# Patient Record
Sex: MALE | Race: WHITE | Employment: FULL TIME | URBAN - NONMETROPOLITAN AREA
[De-identification: names, ages, dates, MRNs, and addresses within clinical notes are randomized per-mention and may not be internally consistent; named-entity substitution may affect disease eponyms.]

---

## 2018-02-08 LAB
CHOLESTEROL, TOTAL: 191 MG/DL
CHOLESTEROL/HDL RATIO: 6.8
HDLC SERPL-MCNC: 28 MG/DL (ref 35–70)
LDL CHOLESTEROL CALCULATED: 136 MG/DL (ref 0–160)
TRIGL SERPL-MCNC: 135 MG/DL
VLDLC SERPL CALC-MCNC: 27 MG/DL

## 2018-02-21 ENCOUNTER — OFFICE VISIT (OUTPATIENT)
Dept: FAMILY MEDICINE CLINIC | Age: 38
End: 2018-02-21
Payer: COMMERCIAL

## 2018-02-21 VITALS
TEMPERATURE: 98.6 F | WEIGHT: 239.8 LBS | SYSTOLIC BLOOD PRESSURE: 118 MMHG | HEIGHT: 69 IN | HEART RATE: 60 BPM | RESPIRATION RATE: 16 BRPM | DIASTOLIC BLOOD PRESSURE: 78 MMHG | BODY MASS INDEX: 35.52 KG/M2

## 2018-02-21 DIAGNOSIS — Z00.00 WELL ADULT HEALTH CHECK: Primary | ICD-10-CM

## 2018-02-21 PROCEDURE — 99395 PREV VISIT EST AGE 18-39: CPT | Performed by: NURSE PRACTITIONER

## 2018-02-21 ASSESSMENT — PATIENT HEALTH QUESTIONNAIRE - PHQ9
SUM OF ALL RESPONSES TO PHQ QUESTIONS 1-9: 0
SUM OF ALL RESPONSES TO PHQ9 QUESTIONS 1 & 2: 0
2. FEELING DOWN, DEPRESSED OR HOPELESS: 0
1. LITTLE INTEREST OR PLEASURE IN DOING THINGS: 0

## 2018-02-21 NOTE — PROGRESS NOTES
swallowing  Cardiovascular:  Chest Pain, Palpitations  Respiratory:  Cough, Wheezing, Shortness of breath, Chest tightness, Apnea  Gastrointestinal:  Nausea, Vomiting, Diarrhea, Constipation, Heartburn, Blood in stool  Genitourinary:  Difficulty or painful urination, Flank pain, Change in frequency, Urgency  Skin:  Color change, Rash, Itching, Wound  Psychiatric:  Hallucinations, Anxiety, Depression, Suicidal ideation  Hematological:  Enlarged glands, Easy bleeding, Easily bruising  Musculoskeletal:  Joint pain, Back pain, Gait problems, Joint swelling, Myalgias  Neurological:  Dizziness, Headaches, Presyncope, Numbness, Seizures, Tremors  Allergy:  Environmental allergies, Food allergies  Endocrine:  Heat Intolerance, Cold Intolerance, Polydipsia, Polyphagia, Polyuria      PHYSICAL EXAM:  Vitals:    02/21/18 0834   BP: 118/78   Pulse: 60   Resp: 16   Temp: 98.6 °F (37 °C)     General Appearance: well developed and well- nourished, in no acute distress  Head: normocephalic and atraumatic  Eyes: extraocular eye movements intact, conjunctivae and eye lids without erythema  ENT: external ear and ear canal normal bilaterally, TMs intact and regular, nose without deformity, nasal mucosa and turbinates normal without polyps, oropharynx normal, dentition is normal for age  Neck: supple and non-tender without mass, no thyromegaly or thyroid nodules, no cervical lymphadenopathy  Pulmonary/Chest: clear to auscultation bilaterally- no wheezes, rales or rhonchi, normal air movement, no respiratory distress or retractions  Cardiovascular: normal rate, regular rhythm, normal S1 and S2, no murmurs, rubs, clicks, or gallops, distal pulses intact, no carotid bruits  Abdomen: soft, non-tender, non-distended, normal bowel sounds,  no rebound or guarding, no masses or hernias noted  Extremities: no cyanosis, clubbing or edema of the lower extremities  Musculoskeletal: No joint swelling or gross deformity   Neuro:  Alert, 5/5 strength

## 2018-03-05 ENCOUNTER — OFFICE VISIT (OUTPATIENT)
Dept: FAMILY MEDICINE CLINIC | Age: 38
End: 2018-03-05
Payer: COMMERCIAL

## 2018-03-05 VITALS
DIASTOLIC BLOOD PRESSURE: 78 MMHG | HEART RATE: 60 BPM | HEIGHT: 69 IN | BODY MASS INDEX: 35.25 KG/M2 | TEMPERATURE: 98.1 F | RESPIRATION RATE: 14 BRPM | WEIGHT: 238 LBS | SYSTOLIC BLOOD PRESSURE: 110 MMHG

## 2018-03-05 DIAGNOSIS — E78.5 DYSLIPIDEMIA (HIGH LDL; LOW HDL): ICD-10-CM

## 2018-03-05 DIAGNOSIS — R07.9 CHEST PAIN, UNSPECIFIED TYPE: Primary | ICD-10-CM

## 2018-03-05 PROCEDURE — 99213 OFFICE O/P EST LOW 20 MIN: CPT | Performed by: NURSE PRACTITIONER

## 2018-03-05 NOTE — PROGRESS NOTES
Chief Complaint   Patient presents with    Follow-up     Pt is here to f/u from [facility] ED on 3/3/18 for chest pain, and SOB. He states that he is doing well, and has not had any problems since. History obtained from chart review and the patient. SUBJECTIVE:  Jose Peralta is a 40 y.o. male that presents today for follow up ER visit for CP, dizziness. The patient states that 3/3/18 he was working out and was jogging on the treadmill. He felt weird while jogging, just tight all over, a little spaced out, he also felt SOB. He got off the treadmill and went to get his knee braces and got back on the treadmill. He was on the treadmill for a couple minutes and developed CP, SOB and felt like his heart was racing, he was also very diaphoretic. He felt like his heart was racing out of his chest. He got off the treadmill and rested on the floor for about 1/2 hour, and then went to the ER. Symptoms resolved within 45 minutes. He reports that in the ER they did EKG and labs and they were all normal, advised to f/u with PCP. Age/Gender Health Maintenance    Lipid - needs  DM Screen - needs  Colon Cancer Screening - n/a  Lung Cancer Screening (Age 54 to [de-identified] with 30 pack year hx, current smoker or quit within past 15 years) - n/a    Tetanus - needs  Influenza Vaccine - needs  Pneumonia Vaccine - needs  Zostavax - n/a   HPV Vaccine - n/a    PSA testing discussion - n/a  AAA Screening - n/a    Falls screening - n/a    No current outpatient prescriptions on file. No current facility-administered medications for this visit. No orders of the defined types were placed in this encounter. All medications reviewed and reconciled, including OTC and herbal medications. Updated list given to patient. There is no problem list on file for this patient. No past medical history on file.     Past Surgical History:   Procedure Laterality Date    APPENDECTOMY      FINGER AMPUTATION No Known Allergies    Social History     Social History    Marital status:      Spouse name: N/A    Number of children: N/A    Years of education: N/A     Occupational History    Not on file. Social History Main Topics    Smoking status: Former Smoker     Packs/day: 2.00     Years: 18.00     Types: Cigarettes    Smokeless tobacco: Never Used    Alcohol use Yes      Comment: rarely     Drug use: No    Sexual activity: Yes     Partners: Female     Other Topics Concern    Not on file     Social History Narrative    No narrative on file       Family History   Problem Relation Age of Onset    Breast Cancer Maternal Grandmother     Diabetes Maternal Grandmother     Breast Cancer Paternal Grandmother     Cancer Paternal Grandfather          I have reviewed the patient's past medical history, past surgical history, allergies, medications, social and family history and I have made updates where appropriate.       Review of Systems  Positive responses are highlighted in bold    Constitutional:  Fever, Chills, Night Sweats, Fatigue, Unexpected changes in weight  Eyes:  Eye discharge, Eye pain, Eye redness, Visual disturbances   HENT:  Ear pain, Tinnitus, Nosebleeds, Trouble swallowing, Hearing loss, Sore throat  Cardiovascular:  Chest Pain, Palpitations, Orthopnea, Paroxysmal Nocturnal Dyspnea  Respiratory:  Cough, Wheezing, Shortness of breath, Chest tightness, Apnea  Gastrointestinal:  Nausea, Vomiting, Diarrhea, Constipation, Heartburn, Blood in stool  Genitourinary:  Difficulty or painful urination, Flank pain, Change in frequency, Urgency  Skin:  Color change, Rash, Itching, Wound  Psychiatric:  Hallucinations, Anxiety, Depression, Suicidal ideation  Hematological:  Enlarged glands, Easy bleeding, Easily bruising  Musculoskeletal:  Joint pain, Back pain, Gait problems, Joint swelling, Myalgias  Neurological:  Dizziness, Headaches, Presyncope, Numbness, Seizures, Tremors  Allergy:

## 2018-03-05 NOTE — PROGRESS NOTES
Visit Information    Have you changed or started any medications since your last visit including any over-the-counter medicines, vitamins, or herbal medicines? no   Are you having any side effects from any of your medications? -  no  Have you stopped taking any of your medications? Is so, why? -  no    Have you seen any other physician or provider since your last visit? Yes - Records Obtained  Have you had any other diagnostic tests since your last visit? Yes - Records Obtained  Have you been seen in the emergency room and/or had an admission to a hospital since we last saw you? Yes - Records Obtained  Have you had your routine dental cleaning in the past 6 months? no    Have you activated your [facility] account? If not, what are your barriers?  Yes     Patient Care Team:  Aspen Rich CNP as PCP - General (Family Medicine)    Medical History Review  Past Medical, Family, and Social History reviewed and does contribute to the patient presenting condition    Health Maintenance   Topic Date Due    HIV screen  08/12/1996    Flu vaccine (1) 09/01/2017    DTaP/Tdap/Td vaccine (2 - Td) 11/15/2026

## 2018-10-26 ENCOUNTER — OFFICE VISIT (OUTPATIENT)
Dept: FAMILY MEDICINE CLINIC | Age: 38
End: 2018-10-26
Payer: COMMERCIAL

## 2018-10-26 VITALS
DIASTOLIC BLOOD PRESSURE: 62 MMHG | SYSTOLIC BLOOD PRESSURE: 134 MMHG | TEMPERATURE: 97.9 F | HEIGHT: 68 IN | BODY MASS INDEX: 41.25 KG/M2 | HEART RATE: 76 BPM | RESPIRATION RATE: 14 BRPM | WEIGHT: 272.2 LBS

## 2018-10-26 DIAGNOSIS — M54.42 ACUTE LEFT-SIDED LOW BACK PAIN WITH LEFT-SIDED SCIATICA: Primary | ICD-10-CM

## 2018-10-26 PROCEDURE — 99213 OFFICE O/P EST LOW 20 MIN: CPT | Performed by: NURSE PRACTITIONER

## 2018-10-26 PROCEDURE — 96372 THER/PROPH/DIAG INJ SC/IM: CPT | Performed by: NURSE PRACTITIONER

## 2018-10-26 RX ORDER — KETOROLAC TROMETHAMINE 30 MG/ML
30 INJECTION, SOLUTION INTRAMUSCULAR; INTRAVENOUS ONCE
Status: COMPLETED | OUTPATIENT
Start: 2018-10-26 | End: 2018-10-26

## 2018-10-26 RX ORDER — CYCLOBENZAPRINE HCL 10 MG
10 TABLET ORAL
COMMUNITY
Start: 2018-10-24 | End: 2019-06-14

## 2018-10-26 RX ORDER — NAPROXEN 500 MG/1
500 TABLET ORAL
COMMUNITY
Start: 2018-10-24 | End: 2019-06-14

## 2018-10-26 RX ORDER — PREDNISONE 20 MG/1
40 TABLET ORAL DAILY
Qty: 10 TABLET | Refills: 0 | Status: SHIPPED | OUTPATIENT
Start: 2018-10-26 | End: 2018-10-31

## 2018-10-26 RX ORDER — METHYLPREDNISOLONE ACETATE 40 MG/ML
80 INJECTION, SUSPENSION INTRA-ARTICULAR; INTRALESIONAL; INTRAMUSCULAR; SOFT TISSUE ONCE
Status: COMPLETED | OUTPATIENT
Start: 2018-10-26 | End: 2018-10-26

## 2018-10-26 RX ORDER — HYDROCODONE BITARTRATE AND ACETAMINOPHEN 5; 325 MG/1; MG/1
1 TABLET ORAL EVERY 6 HOURS PRN
Qty: 12 TABLET | Refills: 0 | Status: SHIPPED | OUTPATIENT
Start: 2018-10-26 | End: 2018-10-29

## 2018-10-26 RX ADMIN — METHYLPREDNISOLONE ACETATE 80 MG: 40 INJECTION, SUSPENSION INTRA-ARTICULAR; INTRALESIONAL; INTRAMUSCULAR; SOFT TISSUE at 12:51

## 2018-10-26 RX ADMIN — KETOROLAC TROMETHAMINE 30 MG: 30 INJECTION, SOLUTION INTRAMUSCULAR; INTRAVENOUS at 12:53

## 2018-10-26 NOTE — PATIENT INSTRUCTIONS
click on the \"Sign Up Now\" link. Current as of: November 29, 2017  Content Version: 11.7  © 2032-6595 [employer]. Care instructions adapted under license by [employer] [facility] ([facility]). If you have questions about a medical condition or this instruction, always ask your healthcare professional. [address] any warranty or liability for your use of this information. Low Back Pain: Exercises  Your Care Instructions  Here are some examples of typical rehabilitation exercises for your condition. Start each exercise slowly. Ease off the exercise if you start to have pain. Your doctor or physical therapist will tell you when you can start these exercises and which ones will work best for you. How to do the exercises  Press-up    7. Lie on your stomach, supporting your body with your forearms. 8. Press your elbows down into the floor to raise your upper back. As you do this, relax your stomach muscles and allow your back to arch without using your back muscles. As your press up, do not let your hips or pelvis come off the floor. 9. Hold for 15 to 30 seconds, then relax. 10. Repeat 2 to 4 times. Alternate arm and leg (bird dog) exercise    Do this exercise slowly. Try to keep your body straight at all times, and do not let one hip drop lower than the other. 1. Start on the floor, on your hands and knees. 2. Tighten your belly muscles. 3. Raise one leg off the floor, and hold it straight out behind you. Be careful not to let your hip drop down, because that will twist your trunk. 4. Hold for about 6 seconds, then lower your leg and switch to the other leg. 5. Repeat 8 to 12 times on each leg. 6. Over time, work up to holding for 10 to 30 seconds each time. 7. If you feel stable and secure with your leg raised, try raising the opposite arm straight out in front of you at the same time. Knee-to-chest exercise    1.  Lie on your back with your knees bent and your feet flat on the

## 2019-01-15 ENCOUNTER — OFFICE VISIT (OUTPATIENT)
Dept: FAMILY MEDICINE CLINIC | Age: 39
End: 2019-01-15
Payer: COMMERCIAL

## 2019-01-15 VITALS
BODY MASS INDEX: 38.65 KG/M2 | WEIGHT: 270 LBS | HEART RATE: 60 BPM | DIASTOLIC BLOOD PRESSURE: 78 MMHG | SYSTOLIC BLOOD PRESSURE: 130 MMHG | RESPIRATION RATE: 18 BRPM | HEIGHT: 70 IN | TEMPERATURE: 98 F

## 2019-01-15 DIAGNOSIS — J06.9 ACUTE UPPER RESPIRATORY INFECTION: Primary | ICD-10-CM

## 2019-01-15 PROCEDURE — 99213 OFFICE O/P EST LOW 20 MIN: CPT | Performed by: NURSE PRACTITIONER

## 2019-01-15 RX ORDER — PROMETHAZINE HYDROCHLORIDE AND CODEINE PHOSPHATE 6.25; 1 MG/5ML; MG/5ML
5 SYRUP ORAL 4 TIMES DAILY PRN
Qty: 120 ML | Refills: 0 | Status: SHIPPED | OUTPATIENT
Start: 2019-01-15 | End: 2019-01-22

## 2019-01-15 RX ORDER — AMOXICILLIN 875 MG/1
875 TABLET, COATED ORAL 2 TIMES DAILY
Qty: 20 TABLET | Refills: 0 | Status: SHIPPED | OUTPATIENT
Start: 2019-01-15 | End: 2019-01-25

## 2019-06-14 ENCOUNTER — OFFICE VISIT (OUTPATIENT)
Dept: BARIATRICS/WEIGHT MGMT | Age: 39
End: 2019-06-14
Payer: COMMERCIAL

## 2019-06-14 VITALS
HEIGHT: 69 IN | RESPIRATION RATE: 18 BRPM | HEART RATE: 72 BPM | WEIGHT: 263.2 LBS | DIASTOLIC BLOOD PRESSURE: 78 MMHG | BODY MASS INDEX: 38.98 KG/M2 | SYSTOLIC BLOOD PRESSURE: 110 MMHG | TEMPERATURE: 97.9 F

## 2019-06-14 DIAGNOSIS — N20.0 NEPHROLITHIASIS: ICD-10-CM

## 2019-06-14 DIAGNOSIS — Z91.89 AT RISK FOR OBSTRUCTIVE SLEEP APNEA: ICD-10-CM

## 2019-06-14 DIAGNOSIS — E66.09 CLASS 2 OBESITY DUE TO EXCESS CALORIES WITH BODY MASS INDEX (BMI) OF 38.0 TO 38.9 IN ADULT, UNSPECIFIED WHETHER SERIOUS COMORBIDITY PRESENT: Primary | ICD-10-CM

## 2019-06-14 PROCEDURE — 99203 OFFICE O/P NEW LOW 30 MIN: CPT | Performed by: SURGERY

## 2019-06-14 RX ORDER — CETIRIZINE HYDROCHLORIDE 10 MG/1
10 TABLET ORAL PRN
COMMUNITY

## 2019-06-14 NOTE — PROGRESS NOTES
Subjective:     Patient ID: Norma Sinclair is a 41 y.o. male    Chief Complaint   Patient presents with    Bariatric, Initial Visit     New patient ; 6 month requirement      HPI  Sorin Morris is a 81-year-old male who presents for initial evaluation secondary to his obesity. BMI 38. He states he has been overweight most of his adult life. He has tried multiple diets and has occasionally been successful at weight loss but unfortunately regains the weight as time goes on. Previously tried the ketogenic diet. Lost about 100 pounds but gained half of it back so far. Currently trying weight watchers as well. He states he has some shortness of breath with increased activity and fatigue due to excess body weight. Lower extremity joint aching and lower back discomfort due to excess body weight. He admits he needs to improve with nutrition and portion sizes. Denies current chest or abdominal pain. No hematochezia or melena. No urinary complaints. BANG score 4. He admits that if he is not able to lose significant weight with medical management only he would like to possibly proceed with a sleeve gastrectomy. Review of Systems   Constitutional: Positive for fatigue. Negative for activity change, appetite change, chills, diaphoresis, fever and unexpected weight change. HENT: Negative for congestion, dental problem, drooling, ear discharge, ear pain, facial swelling, hearing loss, mouth sores, nosebleeds, postnasal drip, rhinorrhea, sinus pressure, sneezing, sore throat, tinnitus, trouble swallowing and voice change. Eyes: Negative for photophobia, pain, discharge, redness, itching and visual disturbance. Respiratory: Negative for apnea, cough, choking, chest tightness, shortness of breath, wheezing and stridor. Cardiovascular: Negative for chest pain, palpitations and leg swelling.    Gastrointestinal: Negative for abdominal distention, abdominal pain, anal bleeding, blood in stool, constipation, diarrhea, nausea, rectal pain and vomiting. Endocrine: Negative. Genitourinary: Negative for decreased urine volume, difficulty urinating, discharge, dysuria, enuresis, flank pain, frequency, genital sores, hematuria, penile pain, penile swelling, scrotal swelling, testicular pain and urgency. Musculoskeletal: Positive for back pain and myalgias. Negative for arthralgias, gait problem, joint swelling, neck pain and neck stiffness. Skin: Negative for color change, pallor, rash and wound. Allergic/Immunologic: Negative. Neurological: Negative for dizziness, tremors, seizures, syncope, facial asymmetry, speech difficulty, weakness, light-headedness, numbness and headaches. Hematological: Negative for adenopathy. Does not bruise/bleed easily. Psychiatric/Behavioral: Negative for agitation, behavioral problems, confusion, decreased concentration, dysphoric mood, hallucinations, self-injury, sleep disturbance and suicidal ideas. The patient is not nervous/anxious and is not hyperactive. Past Medical History:   Diagnosis Date    Kidney stone        Past Surgical History:   Procedure Laterality Date    APPENDECTOMY      FINGER AMPUTATION         Current Outpatient Medications   Medication Sig Dispense Refill    cetirizine (ZYRTEC) 10 MG tablet Take 10 mg by mouth daily       No current facility-administered medications for this visit.         No Known Allergies    Family History   Problem Relation Age of Onset    Heart Disease Mother     Breast Cancer Maternal Grandmother     Diabetes Maternal Grandmother     Breast Cancer Paternal Grandmother     Cancer Paternal Grandfather        Social History     Socioeconomic History    Marital status:      Spouse name: Not on file    Number of children: Not on file    Years of education: Not on file    Highest education level: Not on file   Occupational History    Not on file   Social Needs    Financial resource strain: Not on file   Eneida-Diego insecurity:     Worry: Not on file     Inability: Not on file    Transportation needs:     Medical: Not on file     Non-medical: Not on file   Tobacco Use    Smoking status: Former Smoker     Packs/day: 2.00     Years: 18.00     Pack years: 36.00     Types: Cigarettes     Last attempt to quit: 2016     Years since quitting: 3.4    Smokeless tobacco: Never Used   Substance and Sexual Activity    Alcohol use: Yes     Comment: rarely     Drug use: No    Sexual activity: Yes     Partners: Female   Lifestyle    Physical activity:     Days per week: Not on file     Minutes per session: Not on file    Stress: Not on file   Relationships    Social connections:     Talks on phone: Not on file     Gets together: Not on file     Attends [employer] service: Not on file     Active member of club or organization: Not on file     Attends meetings of clubs or organizations: Not on file     Relationship status: Not on file    Intimate partner violence:     Fear of current or ex partner: Not on file     Emotionally abused: Not on file     Physically abused: Not on file     Forced sexual activity: Not on file   Other Topics Concern    Not on file   Social History Narrative    Not on file     /78 (Site: Left Upper Arm, Position: Sitting, Cuff Size: Large Adult)   Pulse 72   Temp 97.9 °F (36.6 °C) (Oral)   Resp 18   Ht 5' 9\" (1.753 m)   Wt 263 lb 3.2 oz (119.4 kg)   BMI 38.87 kg/m²     Body mass index is 38.87 kg/m². Neck 16\"  Waist 50\"     Objective:   Physical Exam   Constitutional: He is oriented to person, place, and time. He appears well-developed and well-nourished. No distress. HENT:   Head: Normocephalic and atraumatic. Right Ear: External ear normal.   Left Ear: External ear normal.   Nose: Nose normal.   Mouth/Throat: Oropharynx is clear and moist.   Eyes: Conjunctivae and EOM are normal. Right eye exhibits no discharge. Left eye exhibits no discharge. No scleral icterus.    Neck: Normal range of motion. Neck supple. Cardiovascular: Normal rate, regular rhythm, normal heart sounds and intact distal pulses. Pulmonary/Chest: Effort normal and breath sounds normal. No respiratory distress. He has no wheezes. He has no rales. He exhibits no tenderness. Abdominal: Soft. Bowel sounds are normal. He exhibits no distension and no mass. There is no tenderness. There is no rebound and no guarding. Musculoskeletal: Normal range of motion. He exhibits no edema or tenderness. Neurological: He is alert and oriented to person, place, and time. No cranial nerve deficit. Skin: Skin is warm and dry. No rash noted. He is not diaphoretic. No erythema. No pallor. Psychiatric: He has a normal mood and affect. His behavior is normal. Judgment and thought content normal.   Vitals reviewed.       CBC   Lab Results   Component Value Date    WBC 12.0 11/26/2012    RBC 4.95 11/26/2012    HGB 14.8 11/26/2012    HCT 43.6 11/26/2012    MCV 88.2 11/26/2012    MCH 29.9 11/26/2012    MCHC 33.9 11/26/2012    RDW 12.5 11/26/2012     11/26/2012    MPV 8.4 11/26/2012    RBCMORP NORMAL 11/26/2012    SEGSPCT 69.8 11/26/2012    LABLYMP 20.0 11/26/2012    MONOPCT 8.1 11/26/2012    LABEOS 1.8 11/26/2012    BASO 0.3 11/26/2012    NRBC 0 11/26/2012        BMP/CMP   Lab Results   Component Value Date    GLUCOSE 91 11/26/2012    CREATININE 0.9 11/26/2012    BUN 12 11/26/2012     11/26/2012    K 4.1 11/26/2012     11/26/2012    CO2 28 11/26/2012    CALCIUM 9.2 11/26/2012    AST 21 11/26/2012    ALKPHOS 61 11/26/2012    PROT 6.7 11/26/2012    LABALBU 4.5 11/26/2012    BILITOT 0.5 11/26/2012    ALT 31 11/26/2012        PREALBUMIN   No results found for: PREALBUMIN     VITAMIN B12  No results found for: [id number]     24 HOUR URINE CALCIUM   No results found for: URVOLMEAS, HOURSC, CALCIUMUR     VITAMIN D   No results found for: VITD25     VITAMIN B1/THIAMINE   No results found for: [id number]     RBC FOLATE   No results found for: FOLATE     LIPID SCREEN (FASTING)   Lab Results   Component Value Date    CHOL 191 02/08/2018    TRIG 135 02/08/2018    HDL 28 02/08/2018    LDLCALC 136 02/08/2018   ,     HGA1C (T2DM ONLY)   No results found for: LABA1C, AVGG     TSH  No results found for: TSH     IRON   No results found for: IRON     IONIZED CALCIUM   No results found for: IONCA, CAION     Imaging - none    Patient Active Problem List   Diagnosis    Dyslipidemia (high LDL; low HDL)     Assessment:   1. Obesity (BMI 38)  2. Nephrolithiasis    Plan:   1. Long discussion about the pros and cons of weight loss surgery. The risks benefits and alternatives to laparoscopic adjustable band, gastric sleeve and gastric Theo-en-Y bypass were discussed in detail. The pros and cons of robotic assisted, laparoscopic and open techniques were discussed. 2.  Behavior modification discussed in detail in regards to dietary habits. 3.  Nutritional education occurred during visit. Will set up a consultation/evaluation with dietitian for further evaluation. 4.  Options for medical management of morbid obesity discussed. 5.  Improvement in fitness/exercise discussed with patient and the need for this with/without surgery. 6.  Obtain medical necessity letter from PCP as needed. 7.  Follow-up in one month at weight management program at [address] [place] [address]. 8.  Signs and symptoms reviewed with patient that would be concerning and need him to return to office for re-evaluation. Patient states he will call if he has questions or concerns. 9. Multivitamin  10. Psychology evaluation  11. EGD prior to any surgical intervention  12. Encouraged support groups  13. Encouraged Naturally Slim/Rev It Up  14. Discussed the pros and cons along with possible side effects/complications of weight loss medications. All questions answered.   15.  Sleep study    Patient states that if he is not able to lose enough adequate excess body weight with medical management only then he would be like to proceed with surgical intervention such as sleeve gastrectomy/gastric bypass for further weight loss. More than 30 minutes spent with patient today. Greater than 50% of the time was involved counseling, educaton and coordinating care.

## 2019-06-15 ASSESSMENT — ENCOUNTER SYMPTOMS
SORE THROAT: 0
BACK PAIN: 1
EYE DISCHARGE: 0
WHEEZING: 0
COUGH: 0
EYE ITCHING: 0
PHOTOPHOBIA: 0
COLOR CHANGE: 0
CHOKING: 0
APNEA: 0
EYE REDNESS: 0
SINUS PRESSURE: 0
EYE PAIN: 0
ABDOMINAL PAIN: 0
DIARRHEA: 0
ALLERGIC/IMMUNOLOGIC NEGATIVE: 1
ABDOMINAL DISTENTION: 0
CHEST TIGHTNESS: 0
STRIDOR: 0
BLOOD IN STOOL: 0
TROUBLE SWALLOWING: 0
VOMITING: 0
RHINORRHEA: 0
NAUSEA: 0
VOICE CHANGE: 0
RECTAL PAIN: 0
SHORTNESS OF BREATH: 0
CONSTIPATION: 0
FACIAL SWELLING: 0
ANAL BLEEDING: 0

## 2020-04-03 ENCOUNTER — NURSE TRIAGE (OUTPATIENT)
Dept: OTHER | Facility: CLINIC | Age: 40
End: 2020-04-03

## 2020-04-03 NOTE — TELEPHONE ENCOUNTER
Patient called [facility] pre-service center [facility]) to schedule appointment, with red flag complaint, transferred to RN access for triage. Reports having dry cough and body aches, which began Wednesday. Denies any fever. Patient informed of disposition. Provided patient with telehealth options and walk-in clinics. Care advice as documented. Instructed patient to call back with worsening symptoms. Patient verbalized understanding and denies any further questions/concerns. Please do not respond to the triage nurse through this encounter. Any subsequent communication should be directly with the patient.     Reason for Disposition   Using nasal washes and pain medicine > 24 hours and sinus pain persists    Protocols used: COUGH-ADULT-OH

## 2020-04-03 NOTE — TELEPHONE ENCOUNTER
He needs to call the call center [phone] and be triaged and likely present to one of the Flu Clinics.

## 2020-07-09 ENCOUNTER — TELEMEDICINE (OUTPATIENT)
Dept: FAMILY MEDICINE CLINIC | Age: 40
End: 2020-07-09
Payer: COMMERCIAL

## 2020-07-09 PROCEDURE — 99213 OFFICE O/P EST LOW 20 MIN: CPT | Performed by: NURSE PRACTITIONER

## 2020-07-09 RX ORDER — MELOXICAM 15 MG/1
15 TABLET ORAL DAILY
Qty: 30 TABLET | Refills: 3 | Status: SHIPPED | OUTPATIENT
Start: 2020-07-09 | End: 2021-04-21

## 2020-07-09 ASSESSMENT — ENCOUNTER SYMPTOMS
SORE THROAT: 0
NAUSEA: 0
ABDOMINAL PAIN: 0
TROUBLE SWALLOWING: 0
SHORTNESS OF BREATH: 0
EYE PAIN: 0
EYE REDNESS: 0
DIARRHEA: 0
VOMITING: 0
WHEEZING: 0
COLOR CHANGE: 0
COUGH: 0
RHINORRHEA: 0

## 2020-07-09 NOTE — PROGRESS NOTES
(MOBIC) 15 MG tablet Take 1 tablet by mouth daily Yes Nicole Moy, APRN - CNP   cetirizine (ZYRTEC) 10 MG tablet Take 10 mg by mouth daily  Historical Provider, MD       Social History     Tobacco Use    Smoking status: Former Smoker     Packs/day: 2.00     Years: 18.00     Pack years: 36.00     Types: Cigarettes     Last attempt to quit: 2016     Years since quittin.5    Smokeless tobacco: Never Used   Substance Use Topics    Alcohol use: Yes     Comment: rarely     Drug use: No        No Known Allergies,   Past Medical History:   Diagnosis Date    Kidney stone    ,   Past Surgical History:   Procedure Laterality Date    APPENDECTOMY      FINGER AMPUTATION     ,   Family History   Problem Relation Age of Onset    Heart Disease Mother     Breast Cancer Maternal Grandmother     Diabetes Maternal Grandmother     Breast Cancer Paternal Grandmother     Cancer Paternal Grandfather    ,   Immunization History   Administered Date(s) Administered    Influenza Vaccine, unspecified formulation 2016    Pneumococcal Polysaccharide ([id number]) 11/15/2016    Tdap (Boostrix, Adacel) 11/15/2016   ,   Health Maintenance   Topic Date Due    Varicella vaccine (1 of 2 - 2-dose childhood series) 1982    HIV screen  1996    Flu vaccine (1) 2020    DTaP/Tdap/Td vaccine (2 - Td) 11/15/2026    Hepatitis A vaccine  Aged Out    Hepatitis B vaccine  Aged Out    Hib vaccine  Aged Out    Meningococcal (ACWY) vaccine  Aged Out    Pneumococcal 0-64 years Vaccine  Aged Out       PHYSICAL EXAMINATION:  [ INSTRUCTIONS:  \"[x]\" Indicates a positive item  \"[]\" Indicates a negative item  -- DELETE ALL ITEMS NOT EXAMINED]  Vital Signs: (As obtained by patient/caregiver or practitioner observation)    Blood pressure-  Heart rate-    Respiratory rate-    Temperature-  Pulse oximetry-     Constitutional: [x] Appears well-developed and well-nourished [x] No apparent distress      [] Abnormal-   Mental status  [x] Alert and awake  [x] Oriented to person/place/time [x]Able to follow commands      Eyes:  EOM    [x]  Normal  [] Abnormal-  Sclera  [x]  Normal  [] Abnormal -         Discharge [x]  None visible  [] Abnormal -    HENT:   [x] Normocephalic, atraumatic. [] Abnormal   [x] Mouth/Throat: Mucous membranes are moist.     External Ears [x] Normal  [] Abnormal-     Neck: [x] No visualized mass     Pulmonary/Chest: [x] Respiratory effort normal.  [x] No visualized signs of difficulty breathing or respiratory distress        [] Abnormal-      Musculoskeletal:   [x] Normal gait with no signs of ataxia         [x] Normal range of motion of neck        [] Abnormal-       Neurological:        [x] No Facial Asymmetry (Cranial nerve 7 motor function) (limited exam to video visit)          [x] No gaze palsy        [] Abnormal-         Skin:        [x] No significant exanthematous lesions or discoloration noted on facial skin         [] Abnormal-            Psychiatric:       [x] Normal Affect [x] No Hallucinations        [] Abnormal-     Other pertinent observable physical exam findings-     ASSESSMENT/PLAN:  1. Left hip pain  - Suspect hip bursitis  - discussed HEP  - obtain xray and start Mobic  - call if no improvement and would refer to ortho  - XR HIP 2-3 VW W PELVIS LEFT; Future  - meloxicam (MOBIC) 15 MG tablet; Take 1 tablet by mouth daily  Dispense: 30 tablet; Refill: 3      Return if symptoms worsen or fail to improve. Sheri Renee is a 46 y.o. male being evaluated by a Virtual Visit (video visit) encounter to address concerns as mentioned above. A caregiver was present when appropriate. Due to this being a TeleHealth encounter (During [place] [address] public health emergency), evaluation of the following organ systems was limited: Vitals/Constitutional/EENT/Resp/CV/GI//MS/Neuro/Skin/Heme-Lymph-Imm.   Pursuant to the emergency declaration under the [address] [facility] [place] [address] waiver authority and the [employer] Act, this Virtual Visit was conducted with patient's (and/or legal guardian's) consent, to reduce the patient's risk of exposure to COVID-19 and provide necessary medical care. The patient (and/or legal guardian) has also been advised to contact this office for worsening conditions or problems, and seek emergency medical treatment and/or call 911 if deemed necessary. Services were provided through a video synchronous discussion virtually to substitute for in-person clinic visit. Patient and provider were located at their individual homes. --JOEL Ferrari CNP on 7/9/2020 at 10:01 AM    An electronic signature was used to authenticate this note.

## 2020-07-13 ENCOUNTER — PATIENT MESSAGE (OUTPATIENT)
Dept: FAMILY MEDICINE CLINIC | Age: 40
End: 2020-07-13

## 2020-07-13 ENCOUNTER — TELEPHONE (OUTPATIENT)
Dept: FAMILY MEDICINE CLINIC | Age: 40
End: 2020-07-13

## 2020-07-13 ENCOUNTER — HOSPITAL ENCOUNTER (OUTPATIENT)
Dept: GENERAL RADIOLOGY | Age: 40
Discharge: HOME | End: 2020-07-13
Payer: COMMERCIAL

## 2020-07-13 ENCOUNTER — HOSPITAL ENCOUNTER (OUTPATIENT)
Age: 40
Discharge: HOME | End: 2020-07-13
Payer: COMMERCIAL

## 2020-07-13 PROCEDURE — 73502 X-RAY EXAM HIP UNI 2-3 VIEWS: CPT

## 2020-07-13 RX ORDER — TRAMADOL HYDROCHLORIDE 50 MG/1
50 TABLET ORAL EVERY 8 HOURS PRN
Qty: 15 TABLET | Refills: 0 | Status: SHIPPED | OUTPATIENT
Start: 2020-07-13 | End: 2020-07-20 | Stop reason: SDUPTHER

## 2020-07-13 NOTE — TELEPHONE ENCOUNTER
From: Brenda Renee  To: JOEL Dash CNP  Sent: 7/13/2020 4:19 PM EDT  Subject: Visit Follow-Up Question    If you wouldn't mind placing a referral, I guess [facility] would work? I don't know any others unless there is one you prefer to work with, I'm not picky at all!      ----- Message -----   From:JOEL Mix CNP   Sent:7/13/2020 3:01 PM EDT   To:Ciaran Giordano   Subject:RE: Visit Follow-Up Question    Tan Dalton,    Yes I got your xray results back, the office should be calling you about it, but they are normal. I did send a Rx for something for pain to your pharmacy. It should help. Would you like to go ahead and see ortho? If so, let me know who you prefer to see and i'll place the referral.    Ansley Haskins      ----- Message -----   From:Ciaran Giordano   Sent:7/13/2020 2:00 PM EDT   To:JOEL Mix CNP   Subject:Visit Follow-Up Question    I got my X-Ray done this morning, still in a lot of pain, it actually seems to be getting worse. I've been taking a lot of ibuprofen and aleve with little to no relief. I was wondering if it's possible for me to get something for the pain so I can at least sleep comfortably?

## 2020-07-13 NOTE — TELEPHONE ENCOUNTER
From: Tattnall Samples  To: JOEL Carrillo - CNP  Sent: 7/13/2020 2:00 PM EDT  Subject: Visit Follow-Up Question    I got my X-Ray done this morning, still in a lot of pain, it actually seems to be getting worse. I've been taking a lot of ibuprofen and aleve with little to no relief. I was wondering if it's possible for me to get something for the pain so I can at least sleep comfortably?

## 2020-07-13 NOTE — TELEPHONE ENCOUNTER
Pt informed. Pt states he sent my chart msg to [facility], and he has gotten a response from him reference the pain in his hip. See my chart msg from today 7/13/20.

## 2020-07-14 ENCOUNTER — TELEPHONE (OUTPATIENT)
Dept: FAMILY MEDICINE CLINIC | Age: 40
End: 2020-07-14

## 2020-07-14 NOTE — TELEPHONE ENCOUNTER
Regarding referral to [facility]--  Referral to [facility] is canceled as patient has a debt to pay and is in collections. Patient will advise us when this is taken care of and we can place a new referral. Thanks.

## 2020-07-20 ENCOUNTER — PATIENT MESSAGE (OUTPATIENT)
Dept: FAMILY MEDICINE CLINIC | Age: 40
End: 2020-07-20

## 2020-07-20 RX ORDER — TRAMADOL HYDROCHLORIDE 50 MG/1
50 TABLET ORAL EVERY 8 HOURS PRN
Qty: 15 TABLET | Refills: 0 | Status: SHIPPED | OUTPATIENT
Start: 2020-07-20 | End: 2020-07-27 | Stop reason: SDUPTHER

## 2020-07-20 NOTE — TELEPHONE ENCOUNTER
From: Jojo Marroquin  To: JOEL Shen - CNP  Sent: 7/20/2020 4:05 PM EDT  Subject: Prescription Question    The wife got my [facility] appointment moved up to the 27th. I have one day worth of pain meds left and was hoping I could get a refill to get me through as the pain hasn't subsided.

## 2020-07-27 RX ORDER — TRAMADOL HYDROCHLORIDE 50 MG/1
50 TABLET ORAL EVERY 8 HOURS PRN
Qty: 15 TABLET | Refills: 0 | Status: SHIPPED | OUTPATIENT
Start: 2020-07-27 | End: 2020-07-29 | Stop reason: SDUPTHER

## 2020-07-27 NOTE — TELEPHONE ENCOUNTER
Shashank Elder called requesting a refill on the following medications:  Requested Prescriptions     Pending Prescriptions Disp Refills    traMADol (ULTRAM) 50 MG tablet 15 tablet 0     Sig: Take 1 tablet by mouth every 8 hours as needed for Pain for up to 5 days. Pharmacy verified:[facility]  . pv      Date of last visit: 07/09/20  Date of next visit (if applicable): Visit date not found

## 2020-07-28 ENCOUNTER — PATIENT MESSAGE (OUTPATIENT)
Dept: FAMILY MEDICINE CLINIC | Age: 40
End: 2020-07-28

## 2020-07-28 NOTE — TELEPHONE ENCOUNTER
From: Juan Manuel Pro  To: Tameka Banuelos APRN - CNP  Sent: 7/28/2020 10:32 AM EDT  Subject: Visit Follow-Up Question    Michael Kevin to [facility] yesterday. The hip doctor says it's my back causing the pain and scheduled an MRI for 8/5 and an appointment with a back specialist on 8/13 as well as PT between now and then. Wanted to update that the pain is still awful and now the toes on my left foot are numb most of the time. I don't know if I need to get refill of pain meds from you or [facility] but I can't function off aleve and ibuprofen it's just not cutting the pain. Sorry for being a pain in the butt, I just don't know who I should be contacting or how much I should be updating you!

## 2020-07-29 ENCOUNTER — PATIENT MESSAGE (OUTPATIENT)
Dept: FAMILY MEDICINE CLINIC | Age: 40
End: 2020-07-29

## 2020-07-29 RX ORDER — TRAMADOL HYDROCHLORIDE 50 MG/1
50 TABLET ORAL EVERY 8 HOURS PRN
Qty: 42 TABLET | Refills: 0 | Status: SHIPPED | OUTPATIENT
Start: 2020-07-29 | End: 2020-08-12

## 2020-07-29 NOTE — TELEPHONE ENCOUNTER
From: Bacilio Paulson  To: JOEL Farrell CNP  Sent: 7/29/2020 10:59 AM EDT  Subject: Visit Follow-Up Question    I'm unsure if the last message sent but yes, the tramadol keeps me functional!      ----- Message -----   JOEL Hebert CNP   Sent:7/29/2020 10:22 AM EDT   To:Ciaran Alanis   Subject:RE: Visit Follow-Up Question    OK, does the tramadol make things manageable? i'd prefer to do the lowest strength possible pain medication that will help keep things manageable and help you be able to function.      ----- Message -----   From:Ciaran Alanis   Sent:7/29/2020 10:10 AM EDT   To:JOEL Carson CNP   Subject:RE: Visit Follow-Up Question    I called [facility] today and Dr Sejal Hawley will not order pre-op pain meds. I don't want to be a whiner but unfortunately I really can't get through my day to day on ibuprofen and aleve. I did get my appointment with the surgeon moved up from the 13th to the 6th though!      ----- Message -----   From:JOEL Carson CNP   Sent:7/28/2020 11:23 AM EDT   To:Ciaran Alanis   Subject:RE: Visit Follow-Up Question    I would call [facility] and discuss this with them first. If they won't do anything about pain meds, let me know.      ----- Message -----   From:Ciaran Alanis   Sent:9/33/5 11:14 AM EDT   To:JOEL Carson CNP   Subject:RE: Visit Follow-Up Question    I did. He was pretty much in and out of the room in a couple of minutes and I didn't see him again so I assumed I should be contacting you? If I need to contact [facility] I will.       ----- Message -----   From:ELENA DAMON   Sent:7/28/2020 10:58 AM EDT   To:Ciaran Alanis   Subject:RE: Visit Follow-Up Question    Did you mention your concerns to the provider out at [facility]?      ----- Message -----   From:Ciaran Alanis   Sent:7/28/2020 10:32 AM EDT   To:JOEL Carson - CNP   Subject:Visit Follow-Up Question    Suman Cortez to [facility] yesterday.  The hip doctor says it's my back causing the pain and scheduled an MRI for 8/5 and an appointment with a back specialist on 8/13 as well as PT between now and then. Wanted to update that the pain is still awful and now the toes on my left foot are numb most of the time. I don't know if I need to get refill of pain meds from you or [facility] but I can't function off aleve and ibuprofen it's just not cutting the pain. Sorry for being a pain in the butt, I just don't know who I should be contacting or how much I should be updating you!

## 2020-11-03 ENCOUNTER — PATIENT MESSAGE (OUTPATIENT)
Dept: FAMILY MEDICINE CLINIC | Age: 40
End: 2020-11-03

## 2020-11-03 ENCOUNTER — VIRTUAL VISIT (OUTPATIENT)
Dept: FAMILY MEDICINE CLINIC | Age: 40
End: 2020-11-03
Payer: COMMERCIAL

## 2020-11-03 PROCEDURE — 99213 OFFICE O/P EST LOW 20 MIN: CPT | Performed by: NURSE PRACTITIONER

## 2020-11-03 RX ORDER — BENZONATATE 100 MG/1
100-200 CAPSULE ORAL 3 TIMES DAILY PRN
Qty: 42 CAPSULE | Refills: 0 | Status: SHIPPED | OUTPATIENT
Start: 2020-11-03 | End: 2020-11-10

## 2020-11-03 ASSESSMENT — PATIENT HEALTH QUESTIONNAIRE - PHQ9
SUM OF ALL RESPONSES TO PHQ QUESTIONS 1-9: 0
1. LITTLE INTEREST OR PLEASURE IN DOING THINGS: 0
SUM OF ALL RESPONSES TO PHQ9 QUESTIONS 1 & 2: 0
2. FEELING DOWN, DEPRESSED OR HOPELESS: 0
SUM OF ALL RESPONSES TO PHQ QUESTIONS 1-9: 0
SUM OF ALL RESPONSES TO PHQ QUESTIONS 1-9: 0

## 2020-11-03 ASSESSMENT — ENCOUNTER SYMPTOMS
EYE REDNESS: 0
COUGH: 1
WHEEZING: 0
RHINORRHEA: 0
SHORTNESS OF BREATH: 1
TROUBLE SWALLOWING: 0
DIARRHEA: 1
VOMITING: 0
SORE THROAT: 1
ABDOMINAL PAIN: 0
COLOR CHANGE: 0
NAUSEA: 0
EYE PAIN: 0

## 2020-11-03 NOTE — PROGRESS NOTES
benzonatate (TESSALON) 100 MG capsule Take 1-2 capsules by mouth 3 times daily as needed for Cough Yes JOEL Crowe CNP   meloxicam (MOBIC) 15 MG tablet Take 1 tablet by mouth daily  JOEL Crowe CNP   cetirizine (ZYRTEC) 10 MG tablet Take 10 mg by mouth daily  Historical Provider, MD       Social History     Tobacco Use    Smoking status: Former Smoker     Packs/day: 2.00     Years: 18.00     Pack years: 36.00     Types: Cigarettes     Last attempt to quit: 2016     Years since quittin.8    Smokeless tobacco: Never Used   Substance Use Topics    Alcohol use: Yes     Comment: rarely     Drug use: No        No Known Allergies,   Past Medical History:   Diagnosis Date    Kidney stone    ,   Past Surgical History:   Procedure Laterality Date    APPENDECTOMY      FINGER AMPUTATION     ,   Family History   Problem Relation Age of Onset    Heart Disease Mother     Breast Cancer Maternal Grandmother     Diabetes Maternal Grandmother     Breast Cancer Paternal Grandmother     Cancer Paternal Grandfather    ,   Immunization History   Administered Date(s) Administered    Influenza Vaccine, unspecified formulation 2016    Pneumococcal Polysaccharide ([id number]) 11/15/2016    Tdap (Boostrix, Adacel) 11/15/2016   ,   Health Maintenance   Topic Date Due    Varicella vaccine (1 of 2 - 2-dose childhood series) 1982    HIV screen  1996    DTaP/Tdap/Td vaccine (2 - Td) 11/15/2026    Flu vaccine  Completed    Hepatitis A vaccine  Aged Out    Hepatitis B vaccine  Aged Out    Hib vaccine  Aged Out    Meningococcal (ACWY) vaccine  Aged Out    Pneumococcal 0-64 years Vaccine  Aged Out       PHYSICAL EXAMINATION:  [ INSTRUCTIONS:  \"[x]\" Indicates a positive item  \"[]\" Indicates a negative item  -- DELETE ALL ITEMS NOT EXAMINED]  Vital Signs: (As obtained by patient/caregiver or practitioner observation)    Blood pressure-  Heart rate-    Respiratory rate-    Temperature- Pulse oximetry-     Constitutional: [x] Appears well-developed and well-nourished [x] No apparent distress      [] Abnormal-   Mental status  [x] Alert and awake  [x] Oriented to person/place/time [x]Able to follow commands      Eyes:  EOM    [x]  Normal  [] Abnormal-  Sclera  [x]  Normal  [] Abnormal -         Discharge [x]  None visible  [] Abnormal -    HENT:   [x] Normocephalic, atraumatic. [] Abnormal   [x] Mouth/Throat: Mucous membranes are moist.     External Ears [x] Normal  [] Abnormal-     Neck: [x] No visualized mass     Pulmonary/Chest: [x] Respiratory effort normal.  [x] No visualized signs of difficulty breathing or respiratory distress        [] Abnormal-      Musculoskeletal:   [x] Normal gait with no signs of ataxia         [x] Normal range of motion of neck        [] Abnormal-       Neurological:        [x] No Facial Asymmetry (Cranial nerve 7 motor function) (limited exam to video visit)          [x] No gaze palsy        [] Abnormal-         Skin:        [x] No significant exanthematous lesions or discoloration noted on facial skin         [] Abnormal-            Psychiatric:       [x] Normal Affect [x] No Hallucinations        [] Abnormal-     Other pertinent observable physical exam findings-     ASSESSMENT/PLAN:  1. Suspected COVID-19 virus infection  - COVID-19 Ambulatory; Future    2. Cough  - COVID-19 Ambulatory; Future  - benzonatate (TESSALON) 100 MG capsule; Take 1-2 capsules by mouth 3 times daily as needed for Cough  Dispense: 42 capsule; Refill: 0    3. Anosmia  - COVID-19 Ambulatory; Future    - likely COVID  - proceed with testing to confirm  - self quarantine    Return if symptoms worsen or fail to improve. Vazquez Moulton is a 45 y.o. male being evaluated by a Virtual Visit (video visit) encounter to address concerns as mentioned above. A caregiver was present when appropriate.  Due to this being a TeleHealth encounter ([facility]), evaluation of the following organ systems was limited: Vitals/Constitutional/EENT/Resp/CV/GI//MS/Neuro/Skin/Heme-Lymph-Imm. Pursuant to the emergency declaration under the [address] [place] [address], [address] [place] [address] and the [employer] Act, this Virtual Visit was conducted with patient's (and/or legal guardian's) consent, to reduce the patient's risk of exposure to COVID-19 and provide necessary medical care. The patient (and/or legal guardian) has also been advised to contact this office for worsening conditions or problems, and seek emergency medical treatment and/or call 911 if deemed necessary. Services were provided through a video synchronous discussion virtually to substitute for in-person clinic visit. Patient and provider were located at their individual homes. --JOEL Marshall CNP on 11/3/2020 at 11:32 AM    An electronic signature was used to authenticate this note.

## 2020-11-03 NOTE — TELEPHONE ENCOUNTER
From: Carlton Ramos  To: JOEL Rao CNP  Sent: 11/3/2020 6:32 AM EST  Subject: Non-Urgent Medical Question    I was wondering if I could get a Covid test ordered so I can have a test at our local drive through site. I've had body aches and pains since Sunday, low fever and a dry cough sinus burning and as of last night diarrhea and no smell.

## 2020-11-05 ENCOUNTER — TELEPHONE (OUTPATIENT)
Dept: FAMILY MEDICINE CLINIC | Age: 40
End: 2020-11-05

## 2020-11-05 RX ORDER — PROMETHAZINE HYDROCHLORIDE AND CODEINE PHOSPHATE 6.25; 1 MG/5ML; MG/5ML
5 SYRUP ORAL 4 TIMES DAILY PRN
Qty: 120 ML | Refills: 0 | Status: SHIPPED | OUTPATIENT
Start: 2020-11-05 | End: 2020-11-11

## 2020-11-05 NOTE — TELEPHONE ENCOUNTER
i'll send in a cough syrup which can really help him with the cough and to rest.    Controlled Substance Monitoring:    Acute and Chronic Pain Monitoring:   RX Monitoring 11/5/2020   Attestation -   Periodic Controlled Substance Monitoring No signs of potential drug abuse or diversion identified.

## 2020-11-05 NOTE — TELEPHONE ENCOUNTER
Pt says he is doing the same, no better. Still having severe body aches. Cant get comfortable laying or sitting or standing and causing him to not get any sleep.        Pharmacy- [facility]

## 2020-11-05 NOTE — TELEPHONE ENCOUNTER
----- Message from JOEL Tracy CNP sent at 11/5/2020  8:51 AM EST -----  Let Harvinder Layne know his COVID test was positive. Is he feeling any better?

## 2021-04-21 ENCOUNTER — OFFICE VISIT (OUTPATIENT)
Dept: FAMILY MEDICINE CLINIC | Age: 41
End: 2021-04-21
Payer: COMMERCIAL

## 2021-04-21 ENCOUNTER — TELEPHONE (OUTPATIENT)
Dept: FAMILY MEDICINE CLINIC | Age: 41
End: 2021-04-21

## 2021-04-21 VITALS
OXYGEN SATURATION: 97 % | WEIGHT: 286.4 LBS | HEIGHT: 70 IN | TEMPERATURE: 98.3 F | HEART RATE: 71 BPM | BODY MASS INDEX: 41 KG/M2 | RESPIRATION RATE: 10 BRPM | DIASTOLIC BLOOD PRESSURE: 86 MMHG | SYSTOLIC BLOOD PRESSURE: 128 MMHG

## 2021-04-21 DIAGNOSIS — L50.9 URTICARIA: ICD-10-CM

## 2021-04-21 DIAGNOSIS — Z91.09 MULTIPLE ENVIRONMENTAL ALLERGIES: Primary | ICD-10-CM

## 2021-04-21 DIAGNOSIS — J45.20 MILD INTERMITTENT ASTHMA WITH ALLERGIC RHINITIS, UNSPECIFIED WHETHER COMPLICATED: ICD-10-CM

## 2021-04-21 PROBLEM — R00.2 PALPITATIONS: Status: ACTIVE | Noted: 2018-03-03

## 2021-04-21 PROBLEM — R07.89 ATYPICAL CHEST PAIN: Status: ACTIVE | Noted: 2018-03-03

## 2021-04-21 PROCEDURE — 99213 OFFICE O/P EST LOW 20 MIN: CPT | Performed by: NURSE PRACTITIONER

## 2021-04-21 RX ORDER — LORATADINE 10 MG/1
10 TABLET ORAL DAILY
COMMUNITY

## 2021-04-21 RX ORDER — ACETAMINOPHEN 500 MG
1000 TABLET ORAL EVERY 6 HOURS PRN
COMMUNITY

## 2021-04-21 RX ORDER — ALBUTEROL SULFATE 90 UG/1
2 AEROSOL, METERED RESPIRATORY (INHALATION) 4 TIMES DAILY PRN
Qty: 1 INHALER | Refills: 1 | Status: SHIPPED | OUTPATIENT
Start: 2021-04-21

## 2021-04-21 RX ORDER — IBUPROFEN 800 MG/1
800 TABLET ORAL EVERY 6 HOURS PRN
COMMUNITY

## 2021-04-21 RX ORDER — MONTELUKAST SODIUM 10 MG/1
10 TABLET ORAL NIGHTLY
Qty: 30 TABLET | Refills: 5 | Status: SHIPPED | OUTPATIENT
Start: 2021-04-21

## 2021-04-21 SDOH — ECONOMIC STABILITY: TRANSPORTATION INSECURITY
IN THE PAST 12 MONTHS, HAS THE LACK OF TRANSPORTATION KEPT YOU FROM MEDICAL APPOINTMENTS OR FROM GETTING MEDICATIONS?: NO

## 2021-04-21 SDOH — ECONOMIC STABILITY: TRANSPORTATION INSECURITY
IN THE PAST 12 MONTHS, HAS LACK OF TRANSPORTATION KEPT YOU FROM MEETINGS, WORK, OR FROM GETTING THINGS NEEDED FOR DAILY LIVING?: NO

## 2021-04-21 SDOH — ECONOMIC STABILITY: INCOME INSECURITY: HOW HARD IS IT FOR YOU TO PAY FOR THE VERY BASICS LIKE FOOD, HOUSING, MEDICAL CARE, AND HEATING?: NOT HARD AT ALL

## 2021-04-21 SDOH — ECONOMIC STABILITY: FOOD INSECURITY: WITHIN THE PAST 12 MONTHS, YOU WORRIED THAT YOUR FOOD WOULD RUN OUT BEFORE YOU GOT MONEY TO BUY MORE.: NEVER TRUE

## 2021-04-21 ASSESSMENT — PATIENT HEALTH QUESTIONNAIRE - PHQ9
1. LITTLE INTEREST OR PLEASURE IN DOING THINGS: 0
2. FEELING DOWN, DEPRESSED OR HOPELESS: 0
SUM OF ALL RESPONSES TO PHQ QUESTIONS 1-9: 0
SUM OF ALL RESPONSES TO PHQ9 QUESTIONS 1 & 2: 0
SUM OF ALL RESPONSES TO PHQ QUESTIONS 1-9: 0

## 2021-04-21 NOTE — PROGRESS NOTES
Chief Complaint   Patient presents with    Allergies     would like to discuss allergy shots       History obtained from chart review and the patient. SUBJECTIVE:  Nasrin Oleary is a 45 y.o. male that presents today for allergies    Seasonal Allergies  C/o allergy symptoms all year round. Triggers are cats, dogs, pollen, dust, etc. Symptoms are some SOB, wheezing, feels like his mouth is swelling inside of ears swelling, rhinorrhea, itchy/watery eyes. Has worse issues during May when [facility]. He also breaks out into hives whenever his dogs touch him. He gets asthma attacks rarely, maybe one every couple months. He takes Zyrtec daily.     Age/Gender Health Maintenance    Lipid -   Lab Results   Component Value Date    CHOL 191 02/08/2018     Lab Results   Component Value Date    TRIG 135 02/08/2018     Lab Results   Component Value Date    HDL 28 (A) 02/08/2018     Lab Results   Component Value Date    LDLCALC 136 02/08/2018     Lab Results   Component Value Date    VLDL 27 02/08/2018     Lab Results   Component Value Date    CHOLHDLRATIO 6.8 02/08/2018     DM Screen - No results found for: LABA1C  No results found for: EAG    Colon Cancer Screening - 49  Lung Cancer Screening (Age 54 to [de-identified] with 30 pack year hx, current smoker or quit within past 15 years) - n/a    Tetanus - needs  Influenza Vaccine - yearly  Pneumonia Vaccine - 65  Zostavax - 50   HPV Vaccine - n/a    PSA testing discussion - 55  AAA Screening - n/a    Falls screening - n/a    Current Outpatient Medications   Medication Sig Dispense Refill    acetaminophen (TYLENOL) 500 MG tablet Take 1,000 mg by mouth every 6 hours as needed      ibuprofen (ADVIL;MOTRIN) 800 MG tablet Take 800 mg by mouth every 6 hours as needed      loratadine (CLARITIN) 10 MG tablet Take 10 mg by mouth daily Patient reports that he switches between zyrtec and caritin      albuterol sulfate HFA (VENTOLIN HFA) 108 (90 Base) MCG/ACT inhaler Inhale 2 puffs into the lungs 4 times daily as needed for Wheezing 1 Inhaler 1    montelukast (SINGULAIR) 10 MG tablet Take 1 tablet by mouth nightly 30 tablet 5    cetirizine (ZYRTEC) 10 MG tablet Take 10 mg by mouth as needed Patient reports that he switches between zyrtec and caritin       No current facility-administered medications for this visit. Orders Placed This Encounter   Medications    albuterol sulfate HFA (VENTOLIN HFA) 108 (90 Base) MCG/ACT inhaler     Sig: Inhale 2 puffs into the lungs 4 times daily as needed for Wheezing     Dispense:  1 Inhaler     Refill:  1    montelukast (SINGULAIR) 10 MG tablet     Sig: Take 1 tablet by mouth nightly     Dispense:  30 tablet     Refill:  5         All medications reviewed and reconciled, including OTC and herbal medications. Updated list given to patient.        Patient Active Problem List   Diagnosis    Dyslipidemia (high LDL; low HDL)    Atypical chest pain    Palpitations       Past Medical History:   Diagnosis Date    Kidney stone        Past Surgical History:   Procedure Laterality Date    APPENDECTOMY      FINGER AMPUTATION         No Known Allergies    Social History     Socioeconomic History    Marital status:      Spouse name: Not on file    Number of children: Not on file    Years of education: Not on file    Highest education level: Not on file   Occupational History    Not on file   Social Needs    Financial resource strain: Not hard at all   [place] insecurity     Worry: Never true     Inability: Never true   [employer] needs     Medical: No     Non-medical: No   Tobacco Use    Smoking status: Former Smoker     Packs/day: 2.00     Years: 18.00     Pack years: 36.00     Types: Cigarettes     Quit date:      Years since quittin.3    Smokeless tobacco: Never Used   Substance and Sexual Activity    Alcohol use: Yes     Comment: rarely     Drug use: No    Sexual activity: Yes     Partners: Female   Lifestyle    Physical activity     Days per week: Not on file     Minutes per session: Not on file    Stress: Not on file   Relationships    Social connections     Talks on phone: Not on file     Gets together: Not on file     Attends Spiritism service: Not on file     Active member of club or organization: Not on file     Attends meetings of clubs or organizations: Not on file     Relationship status: Not on file    Intimate partner violence     Fear of current or ex partner: Not on file     Emotionally abused: Not on file     Physically abused: Not on file     Forced sexual activity: Not on file   Other Topics Concern    Not on file   Social History Narrative    Not on file       Family History   Problem Relation Age of Onset    Heart Disease Mother     Breast Cancer Maternal Grandmother     Diabetes Maternal Grandmother     Breast Cancer Paternal Grandmother     Cancer Paternal Grandfather          I have reviewed the patient's past medical history, past surgical history, allergies, medications, social and family history and I have made updates where appropriate.       Review of Systems  Positive responses are highlighted in bold    Constitutional:  Fever, Chills, Night Sweats, Fatigue, Unexpected changes in weight  Eyes:  Eye discharge, Eye pain, Eye redness, Visual disturbances   HENT:  Ear pain, Tinnitus, Nosebleeds, Trouble swallowing, Hearing loss, Sore throat  Cardiovascular:  Chest Pain, Palpitations, Orthopnea, Paroxysmal Nocturnal Dyspnea  Respiratory:  Cough, Wheezing, Shortness of breath, Chest tightness, Apnea  Gastrointestinal:  Nausea, Vomiting, Diarrhea, Constipation, Heartburn, Blood in stool  Genitourinary:  Difficulty or painful urination, Flank pain, Change in frequency, Urgency  Skin:  Color change, Rash, Itching, Wound  Psychiatric:  Hallucinations, Anxiety, Depression, Suicidal ideation  Hematological:  Enlarged glands, Easy bleeding, Easily bruising  Musculoskeletal:  Joint pain, Back pain, Gait problems, Joint swelling, Myalgias  Neurological:  Dizziness, Headaches, Presyncope, Numbness, Seizures, Tremors  Allergy:  Environmental allergies, Food allergies  Endocrine:  Heat Intolerance, Cold Intolerance, Polydipsia, Polyphagia, Polyuria    PHYSICAL EXAM:  Vitals:    04/21/21 0921   BP: 128/86   Pulse: 71   Resp: 10   Temp: 98.3 °F (36.8 °C)   TempSrc: Oral   SpO2: 97%   Weight: 286 lb 6.4 oz (129.9 kg)   Height: 5' 9.88\" (1.775 m)     Body mass index is 41.23 kg/m². VS Reviewed  General Appearance: A&O x 3, No acute distress,well developed and well- nourished  Head: normocephalic and atraumatic  Eyes: pupils equal, round, and reactive to light, extraocular eye movements intact, conjunctivae and eye lids without erythema  Neck: supple and non-tender without mass, no thyromegaly or thyroid nodules, no cervical lymphadenopathy  Pulmonary/Chest: clear to auscultation bilaterally- no wheezes, rales or rhonchi, normal air movement, no respiratory distress or retractions  Cardiovascular: S1 and S2 auscultated w/ RRR. No murmurs, rubs, clicks, or gallops, distal pulses intact. Abdomen: soft, non-tender, non-distended, bowl sounds physiologic,  no rebound or guarding, no masses or hernias noted. Liver and spleen without enlargement. Extremities: no cyanosis, clubbing or edema of the lower extremities  Musculoskeletal: No joint swelling or gross deformity   Neuro:  Alert, 5/5 strength globally and symmetrically  Psych: Affect appropriate. Mood normal. Thought process is normal without evidence of depression or psychosis. Good insight and appropriate interaction. Cognition and memory appear to be intact. Skin: warm and dry, no rash or erythema  Lymph:  No cervical, auricular or supraclavicular lymph nodes palpated    ASSESSMENT & PLAN  Ramonita Landaverde was seen today for allergies. Diagnoses and all orders for this visit:    Multiple environmental allergies  -     montelukast (SINGULAIR) 10 MG tablet;  Take 1 tablet by mouth nightly  -     External Referral To Allergy    Mild intermittent asthma with allergic rhinitis, unspecified whether complicated  -     Full PFT Study With Bronchodilator; Future  -     albuterol sulfate HFA (VENTOLIN HFA) 108 (90 Base) MCG/ACT inhaler; Inhale 2 puffs into the lungs 4 times daily as needed for Wheezing  -     montelukast (SINGULAIR) 10 MG tablet; Take 1 tablet by mouth nightly  -     External Referral To Allergy    Urticaria  -     External Referral To Allergy      - con't Zyrtec  - add Singulair  - obtain PFT and refill of albuterol which he can use prn  - refer to Allergy for further management    DISPOSITION    Return if symptoms worsen or fail to improve. Michell Ortega released without restrictions. PATIENT COUNSELING    Counseling was provided today regarding the following topics: Healthy eating habits, Regular exercise, substance abuse and healthy sleep habits. Michell Ortega received counseling on the following healthy behaviors: tobacco cessation    Patient given educational materials on: See Attached    I have instructed Michell Ortega to complete a self tracking handout on none and instructed them to bring it with them to his next appointment. Barriers to learning and self management: none    Discussed use, benefit, and side effects of prescribed medications. Barriers to medication compliance addressed. All patient questions answered. Pt voiced understanding.        Electronically signed by JOEL Ni CNP on 4/21/2021 at 9:33 AM

## 2021-04-21 NOTE — TELEPHONE ENCOUNTER
scheduling PFT 04/30/21 @ [facility] @ 1:00pm   Arrive @ 12:45 , due to construction follow the yellow signs to enter [facility] .    Bring ID and Insurance cards   ** no breathing meds 4 hrs prior  **

## 2021-05-10 ENCOUNTER — TELEPHONE (OUTPATIENT)
Dept: FAMILY MEDICINE CLINIC | Age: 41
End: 2021-05-10

## 2021-05-10 NOTE — TELEPHONE ENCOUNTER
----- Message from JOEL Hansen CNP sent at 5/10/2021  8:55 AM EDT -----  Let Niurka uJarez know his lung function test was normal which is not too surprising since he has more mild asthma. rec'd he f/u with the allergist as scheduled.

## 2023-07-25 NOTE — PATIENT INSTRUCTIONS
exposed skin. · See a dentist one or two times a year for checkups and to have your teeth cleaned. · Wear a seat belt in the car. · Drink alcohol in moderation, if at all. That means no more than 2 drinks a day for men and 1 drink a day for women. Follow your doctor's advice about when to have certain tests. These tests can spot problems early. For everyone  · Cholesterol. Have the fat (cholesterol) in your blood tested after age 21. Your doctor will tell you how often to have this done based on your age, family history, or other things that can increase your risk for heart disease. · Blood pressure. Have your blood pressure checked during a routine doctor visit. Your doctor will tell you how often to check your blood pressure based on your age, your blood pressure results, and other factors. · Vision. Talk with your doctor about how often to have a glaucoma test.  · Diabetes. Ask your doctor whether you should have tests for diabetes. · Colon cancer. Have a test for colon cancer at age 48. You may have one of several tests. If you are younger than 48, you may need a test earlier if you have any risk factors. Risk factors include whether you already had a precancerous polyp removed from your colon or whether your parent, brother, sister, or child has had colon cancer. For women  · Breast exam and mammogram. Talk to your doctor about when you should have a clinical breast exam and a mammogram. Medical experts differ on whether and how often women under 50 should have these tests. Your doctor can help you decide what is right for you. · Pap test and pelvic exam. Begin Pap tests at age 24. A Pap test is the best way to find cervical cancer. The test often is part of a pelvic exam. Ask how often to have this test.  · Tests for sexually transmitted infections (STIs). Ask whether you should have tests for STIs.  You may be at risk if you have sex with more than one person, especially if your partners do not wear
24

## 2024-02-09 ENCOUNTER — OFFICE VISIT (OUTPATIENT)
Dept: BARIATRICS/WEIGHT MGMT | Age: 44
End: 2024-02-09
Payer: COMMERCIAL

## 2024-02-09 VITALS
HEART RATE: 94 BPM | TEMPERATURE: 97.9 F | WEIGHT: 279.6 LBS | DIASTOLIC BLOOD PRESSURE: 102 MMHG | SYSTOLIC BLOOD PRESSURE: 144 MMHG | HEIGHT: 68 IN | BODY MASS INDEX: 42.37 KG/M2

## 2024-02-09 DIAGNOSIS — N20.0 NEPHROLITHIASIS: ICD-10-CM

## 2024-02-09 DIAGNOSIS — E66.01 MORBID OBESITY WITH BMI OF 40.0-44.9, ADULT (HCC): Primary | ICD-10-CM

## 2024-02-09 DIAGNOSIS — G47.33 OBSTRUCTIVE SLEEP APNEA: ICD-10-CM

## 2024-02-09 DIAGNOSIS — G89.29 CHRONIC LOW BACK PAIN, UNSPECIFIED BACK PAIN LATERALITY, UNSPECIFIED WHETHER SCIATICA PRESENT: ICD-10-CM

## 2024-02-09 DIAGNOSIS — M54.50 CHRONIC LOW BACK PAIN, UNSPECIFIED BACK PAIN LATERALITY, UNSPECIFIED WHETHER SCIATICA PRESENT: ICD-10-CM

## 2024-02-09 PROCEDURE — 99204 OFFICE O/P NEW MOD 45 MIN: CPT | Performed by: SURGERY

## 2024-02-09 RX ORDER — SEMAGLUTIDE 2.4 MG/.75ML
2.4 INJECTION, SOLUTION SUBCUTANEOUS WEEKLY
COMMUNITY
Start: 2023-12-08

## 2024-02-09 RX ORDER — TESTOSTERONE CYPIONATE 200 MG/ML
INJECTION, SOLUTION INTRAMUSCULAR
COMMUNITY
Start: 2024-01-31

## 2024-02-10 ASSESSMENT — ENCOUNTER SYMPTOMS
ABDOMINAL PAIN: 0
CONSTIPATION: 0
BLOOD IN STOOL: 0
CHOKING: 0
VOICE CHANGE: 0
BACK PAIN: 0
EYE PAIN: 0
VOMITING: 0
SHORTNESS OF BREATH: 0
ABDOMINAL DISTENTION: 0
APNEA: 0
EYE DISCHARGE: 0
NAUSEA: 0
ANAL BLEEDING: 0
WHEEZING: 0
SORE THROAT: 0
EYE ITCHING: 0
FACIAL SWELLING: 0
EYE REDNESS: 0
CHEST TIGHTNESS: 0
TROUBLE SWALLOWING: 0
RECTAL PAIN: 0
STRIDOR: 0
SINUS PRESSURE: 0
PHOTOPHOBIA: 0
COUGH: 0
ALLERGIC/IMMUNOLOGIC NEGATIVE: 1
RHINORRHEA: 0
COLOR CHANGE: 0
DIARRHEA: 0

## 2024-02-10 NOTE — PROGRESS NOTES
Ciaran Owen (:  1980)     ASSESSMENT:  1.  Morbid obesity (BMI 42)  2.  Chronic lower back pain  3.  Sleep apnea  4.  Nephrolithiasis    PLAN:  1. Long discussion about the pros and cons of weight loss surgery.  The risks benefits and alternatives to laparoscopic adjustable band, gastric sleeve and gastric Theo-en-Y bypass were discussed in detail.  The pros and cons of robotic assisted, laparoscopic and open techniques were discussed.  2.  Behavior modification discussed in detail in regards to dietary habits.  3.  Nutritional education occurred during visit.  Will set up a consultation/evaluation with dietitian for further evaluation.   4.  Options for medical management of morbid obesity discussed.  5.  Improvement in fitness/exercise discussed with patient and the need for this with/without surgery.  6.  Obtain medical necessity letter from PCP as needed.  7.  Follow-up in one month at weight management program at [facility].  8.  Signs and symptoms reviewed with patient that would be concerning and need him to return to office for re-evaluation. Patient states he will call if he has questions or concerns.   9. Multivitamin  10.  Psychology evaluation  11.  EGD prior to any surgical intervention  12.  Encouraged support groups  13.  Encouraged Naturally Slim/Rev It Up  14.  Discussed the pros and cons along with possible side effects/complications of weight loss medications.  All questions answered.    Patient states that if he is not able to lose enough adequate excess body weight with medical management only then he would be like to proceed with surgical intervention such as sleeve gastrectomy/gastric bypass for further weight loss.    Total time spent today: 53 minutes     SUBJECTIVE/OBJECTIVE:    Chief Complaint   Patient presents with    New Patient     New pt - desires surgery     GAIL Wagoner is a 43-year-old male who presents for initial evaluation at the weight management

## 2024-02-12 NOTE — PROGRESS NOTES
kcal x 1.2 (activity factor)= 2574 kcal - 500-1000 calories/day  (for 1-2 lb weight loss/week)= 8846-4834 calories per day  Food recall reveals often eating twice a day with sometimes heavier snack in evening and at times contributes to GERD  Suspect decrease GI motility with GLP-1.  Shows interest in working on nutrition behaviors in preparation for bariatric surgery    PES Statement:  Overweight/Obesity related to reduced exercise, sedentary lifestyle, varied eating habits and eating patterns, and hx of unsuccessful diet attempts as evidenced by  Body mass index is 42.42 kg/m²..    Surgery  Surgical procedure desired: gastric sleeve   Patient's greatest concern about having surgery is: recovery post op.  Patient describes the motivation for weight loss surgery to be: Be active with your 8 year old and grand kids in future.    The expectations following the surgery were reviewed in detail with patient today and patient made aware will require to eliminate carbonated beverages, aim for regular meal times, monitor portion sizes, and balanced meals..   he does feel he can deal with the dietary restrictions.     Patient states he does understand the consequences of not complying with post-op food guidelines.  Patient states he understands the long term changes in food intake that will be necessary for all occasions after surgery for the rest of her life.     Patient is deemed nutritionally appropriate to proceed if able to show progress towards nutrition behavior changes discussed today.    Plan  Patient will begin working on recommendations from Pre-Weight Loss Surgery Behavior Change Goal Sheet that was reviewed today to assist with weight loss and establish a  long term healthy eating pattern.  Individual goals set with patient to be reviewed at monthly office visits.    Weight loss goal of 3-5% from initial weight at first visit with Dr Oviedo reviewed with patient to achieve over 6 month period per insurance

## 2024-02-13 ENCOUNTER — OFFICE VISIT (OUTPATIENT)
Dept: BARIATRICS/WEIGHT MGMT | Age: 44
End: 2024-02-13

## 2024-02-13 VITALS — HEIGHT: 68 IN | WEIGHT: 279 LBS | BODY MASS INDEX: 42.28 KG/M2

## 2024-02-13 DIAGNOSIS — Z13.21 MALNUTRITION SCREEN: ICD-10-CM

## 2024-02-13 DIAGNOSIS — E78.5 DYSLIPIDEMIA (HIGH LDL; LOW HDL): ICD-10-CM

## 2024-02-13 DIAGNOSIS — G47.33 OBSTRUCTIVE SLEEP APNEA: Primary | ICD-10-CM

## 2024-02-13 DIAGNOSIS — E66.01 MORBID OBESITY WITH BMI OF 40.0-44.9, ADULT (HCC): Primary | ICD-10-CM

## 2024-02-13 DIAGNOSIS — Z01.818 PRE-OP TESTING: ICD-10-CM

## 2024-02-13 NOTE — PATIENT INSTRUCTIONS
Goals:  1. I will get the lab work done that is mailed to my mailing address before next office visit.  You will need to fast 10-12 hours for this (no food or drink besides water).  2  I will aim for at least 64 oz of water each day (= 8 cups per day or four bottled haines)  3.  I will use my food journal to record meal times, serving sizes and bring back to next dietitian visit.  4   I will increase my physical activity by continuing treadmill and/or walking outside at least four days a week  5.  Initial weight loss goal of 3-5% is recommended over 6 month period.  This is a minimum of: 8-14 lbs from your weight when initially met with physician of: 279 lbs.

## 2024-02-27 DIAGNOSIS — E66.01 MORBID OBESITY WITH BMI OF 40.0-44.9, ADULT (HCC): ICD-10-CM

## 2024-02-27 DIAGNOSIS — Z13.21 MALNUTRITION SCREEN: ICD-10-CM

## 2024-02-27 DIAGNOSIS — Z01.818 PRE-OP TESTING: ICD-10-CM

## 2024-02-28 DIAGNOSIS — Z01.818 PRE-OP TESTING: ICD-10-CM

## 2024-02-28 DIAGNOSIS — E66.01 MORBID OBESITY WITH BMI OF 40.0-44.9, ADULT (HCC): ICD-10-CM

## 2024-02-28 DIAGNOSIS — E55.9 VITAMIN D DEFICIENCY: Primary | ICD-10-CM

## 2024-02-28 DIAGNOSIS — Z13.21 MALNUTRITION SCREEN: ICD-10-CM

## 2024-02-28 RX ORDER — ERGOCALCIFEROL 1.25 MG/1
50000 CAPSULE ORAL WEEKLY
Qty: 12 CAPSULE | Refills: 0 | Status: SHIPPED | OUTPATIENT
Start: 2024-02-28

## 2024-03-12 NOTE — PROGRESS NOTES
Assessment: Patient is a 43 y.o. male seen for   month one  follow up MNT visit for  pre op bariatric surgery desires sleeve    Vitals from current and previous visits:   3/13/2024  9:24 AM   Vitals    SYSTOLIC    DIASTOLIC    Site    Position    Cuff Size    Pulse    Temp    Respirations    SpO2    Weight - Scale 271 lb    Height 5' 8\"    Body Mass Index 41.21 kg/m2 (H)    Waist (Inches)    Neck circumference (Inches)         Initial weight at start of Weight Management Program was: 279 lbs     Ciaran lost 8 lbs over one month  -Weight goal: lose weight.     -Nutritionally relevant labs:  PTH-106, B1-197, Vit A-54, Ferritin-11, Vit D less than 19  Lab Results   Component Value Date/Time    GLUCOSE 91 11/26/2012 12:52 PM    CHOL 191 02/08/2018 12:00 AM    HDL 28 (A) 02/08/2018 12:00 AM    LDLCALC 136 02/08/2018 12:00 AM    TRIG 135 02/08/2018 12:00 AM     Has Wegovy 2.4mg from June 2023 thru current as now able to get at affordable cost         RUMA appointment is 4/8/24    - Is patient taking daily Multivitamin:  Changed to Equate MVI for adults once a day and will increase to two a day for 36 mg iron/day with low ferritin,  Started [place] 600 mg Calcium in evenings for PTH level.  Weekly Vitamin D3 50,000IUs for low level    Pt, spouse and 8 year old and 19 year old  Works full time ~ 8a-5pm M-F.  Goes to bed ~ 9p-10p and wakes up 5am    -Food Recall:   Using My Fitness Pal to log foods averaging ~ 7177-3425 calories  Breakfast: 7:30am- now trying to eat in morning- banana, or apple and string cheese.  Or Protein Shake- Premier [employer]  Lunch: 11:30a-12p- goes home for lunch - Healthy Choice Meal or can of soup  After work- 5pm- string cheese, apple or banana.   Dinner: 7-8pm - chicken enchilada last evening  Snack: trying not to snack much in evening.  Sometimes has fruit  Main Beverages: water with Great Value brand energy squeezer in morning (90mg caffeine in 3 ml's)  flavor enhancer at times.  Total of

## 2024-03-13 ENCOUNTER — OFFICE VISIT (OUTPATIENT)
Dept: BARIATRICS/WEIGHT MGMT | Age: 44
End: 2024-03-13
Payer: COMMERCIAL

## 2024-03-13 ENCOUNTER — OFFICE VISIT (OUTPATIENT)
Dept: BARIATRICS/WEIGHT MGMT | Age: 44
End: 2024-03-13

## 2024-03-13 VITALS
WEIGHT: 271 LBS | HEART RATE: 72 BPM | SYSTOLIC BLOOD PRESSURE: 132 MMHG | DIASTOLIC BLOOD PRESSURE: 80 MMHG | TEMPERATURE: 98.2 F | HEIGHT: 68 IN | BODY MASS INDEX: 41.07 KG/M2

## 2024-03-13 VITALS — HEIGHT: 68 IN | BODY MASS INDEX: 41.07 KG/M2 | WEIGHT: 271 LBS

## 2024-03-13 DIAGNOSIS — R79.89 LOW TESTOSTERONE: ICD-10-CM

## 2024-03-13 DIAGNOSIS — G89.29 CHRONIC LOW BACK PAIN, UNSPECIFIED BACK PAIN LATERALITY, UNSPECIFIED WHETHER SCIATICA PRESENT: ICD-10-CM

## 2024-03-13 DIAGNOSIS — N20.0 NEPHROLITHIASIS: ICD-10-CM

## 2024-03-13 DIAGNOSIS — M54.50 CHRONIC LOW BACK PAIN, UNSPECIFIED BACK PAIN LATERALITY, UNSPECIFIED WHETHER SCIATICA PRESENT: ICD-10-CM

## 2024-03-13 DIAGNOSIS — E55.9 VITAMIN D DEFICIENCY: ICD-10-CM

## 2024-03-13 DIAGNOSIS — G47.33 OSA ON CPAP: ICD-10-CM

## 2024-03-13 DIAGNOSIS — R79.0 LOW FERRITIN: ICD-10-CM

## 2024-03-13 PROCEDURE — 99214 OFFICE O/P EST MOD 30 MIN: CPT | Performed by: PHYSICIAN ASSISTANT

## 2024-03-13 RX ORDER — OMEPRAZOLE 40 MG/1
40 CAPSULE, DELAYED RELEASE ORAL DAILY
COMMUNITY
Start: 2024-03-05

## 2024-03-13 NOTE — PATIENT INSTRUCTIONS
Goals:  Nutrition Goal: Continue to log foods using My Fitness Pal and keep self accountable.  Continue not to skip meals  Water Goal: I will continue at least 64 oz or greater each day  Exercise Goal:  Continue walks outside or use treadmill at least four days a week  Increase Equate Multivitamin Complete for adults to two in morning

## 2024-03-13 NOTE — PROGRESS NOTES
citrate daily.    Ferritin 11- Continue Equate MVI 2 daily.   B1 slightly elevated at 197- will monitor  Drug screen completed and reviewed  Nicotine (-). Discussed risks of nicotine a/w bariatric surgery. Must be nicotine free at least 90 days prior to surgery. Avoid nicotine life long post-op.   EKG completed and reviewed. NSR.    Will need to be off work for 3-4 weeks post-bariatric surgery.  No lifting/pushing/pulling over 20# for 4 weeks post-op  No NSAIDS 10 days prior to bariatric surgery. Avoid NSAID use post-op  Discussed Lovenox post-op bariatric surgery  Awaiting LOMN   Will continue following with multi-disciplinary team in preparation for bariatric surgery with the expectation of lifelong follow-up post-operatively.   Return in about 1 month (around 4/13/2024) for Follow up.     I spent over 37 minutes with the patient, with greater that 50% of that time spent on education, counseling and coordination of care.     Electronically signed by ARLIN Avalos on 3/13/2024 at 10:30 AM

## 2024-03-13 NOTE — PATIENT INSTRUCTIONS
Behavior modification discussed in detail in regards to dietary habits.  Nutritional education occurred during visit. Continue following recommendations  per dietitian.  Improvement in fitness/exercise discussed with patient and the need for this  with/without surgery.   Sleep apnea clearance and 30 day download needed prior to surgery- apt with Dr. Chpaa 4/8/24  Psychology evaluation with Dr. Lopez 3/15 and 4/26  EGD prior to any surgical intervention- will send referral at a later date  Encouraged to attend support groups. Completed x 2.   Goal to lose 3% TBW prior to surgery.  Seca scale next month  Initial labs completed and reviewed    Pth 106/ Vit D 19- continue weekly vit D x 12 weeks and Calcium citrate daily.    Ferritin 11- Continue Equate MVI 2 daily.   B1 slightly elevated at 197- will monitor  Drug screen completed and reviewed  Nicotine (-). Discussed risks of nicotine a/w bariatric surgery. Must be nicotine free at least 90 days prior to surgery. Avoid nicotine life long post-op.   EKG completed and reviewed. NSR.    Will need to be off work for 3-4 weeks post-bariatric surgery.  No lifting/pushing/pulling over 20# for 4 weeks post-op  No NSAIDS 10 days prior to bariatric surgery. Avoid NSAID use post-op  Discussed Lovenox post-op bariatric surgery  Awaiting LOMN   Will continue following with multi-disciplinary team in preparation for bariatric surgery with the expectation of lifelong follow-up post-operatively.

## 2024-03-15 ENCOUNTER — OFFICE VISIT (OUTPATIENT)
Dept: PSYCHOLOGY | Age: 44
End: 2024-03-15
Payer: COMMERCIAL

## 2024-03-15 DIAGNOSIS — E66.01 MORBID OBESITY (HCC): ICD-10-CM

## 2024-03-15 DIAGNOSIS — F10.21 ALCOHOL USE DISORDER, SEVERE, IN SUSTAINED REMISSION (HCC): ICD-10-CM

## 2024-03-15 DIAGNOSIS — F54 PSYCHOLOGICAL FACTORS AFFECTING MEDICAL CONDITION: Primary | ICD-10-CM

## 2024-03-15 PROCEDURE — 90791 PSYCH DIAGNOSTIC EVALUATION: CPT | Performed by: PSYCHOLOGIST

## 2024-03-15 PROCEDURE — 96130 PSYCL TST EVAL PHYS/QHP 1ST: CPT | Performed by: PSYCHOLOGIST

## 2024-03-15 PROCEDURE — 96131 PSYCL TST EVAL PHYS/QHP EA: CPT | Performed by: PSYCHOLOGIST

## 2024-04-05 NOTE — PROGRESS NOTES
New Sleep Patient H/P    Presentation:  Ciaran is referred by Digna OLIVEROS for  RUMA    Ciaran is a 43-year-old gentleman with history of obstructive sleep apnea diagnosed at [facility] via type III polysomnogram completed on December 14, 2021, his ALBERTA was 48.3 consistent with severe obstructive sleep apnea.  Started on APAP 4 to 20 cm of water pressure, his weight at the time was 224 pounds today is 275 pounds.    Ciaran is having issues with his weight, his max weight was 340 pounds a few months ago, he was a started on Wegovy with success he has lost about 70 pounds but he is going to go forward with metabolic surgery (robotic sleeve gastrectomy).    Excellent compliance and control with his device, I have download available from March 9, 2024 through April 7, 2024 revealing a 4-hour compliance of 87%, a residual AHI of 0.7, average nightly use of CPAP of 7 hours and 13 minutes current APAP settings 4 to 20 cm of water with an EPR of 1, requesting supplies specifically ResMed F30 interface    Dalia is a  for the 's office and he is on call 24/7 with frequent nocturnal calls  His [place] sleepiness scale score is 3 sleep apnea quality of life index 81    Denies daytime sleepiness, denies sleepiness while driving, sleep is sound and refreshing, no difficulty waking up in the mornings    Environmental allergies on montelukast and loratadine.      Time in Bed:   Bedtime: 9 p.m.   Awakens  6 a.m.   Different on weekends? No       Ciaran falls asleep in 15  minutes.  Any awakenings? Yes  Difficulty Falling back to sleep? No      Previous evaluation and treatment?Yes  Where? [facility]  Which ones?  [facility]    He denies any history of sleep walking or sleep talking. No history of seizures activity. No history suggestive of restless legs syndrome. No history of bruxism. No history of head injury.    Naps:  Any naps? No     Snoring

## 2024-04-08 ENCOUNTER — OFFICE VISIT (OUTPATIENT)
Dept: BARIATRICS/WEIGHT MGMT | Age: 44
End: 2024-04-08
Payer: COMMERCIAL

## 2024-04-08 ENCOUNTER — OFFICE VISIT (OUTPATIENT)
Dept: PULMONOLOGY | Age: 44
End: 2024-04-08
Payer: COMMERCIAL

## 2024-04-08 VITALS
HEIGHT: 68 IN | HEART RATE: 68 BPM | TEMPERATURE: 97.7 F | WEIGHT: 273 LBS | SYSTOLIC BLOOD PRESSURE: 138 MMHG | DIASTOLIC BLOOD PRESSURE: 80 MMHG | BODY MASS INDEX: 41.37 KG/M2

## 2024-04-08 VITALS
DIASTOLIC BLOOD PRESSURE: 88 MMHG | HEIGHT: 68 IN | BODY MASS INDEX: 41.71 KG/M2 | WEIGHT: 275.2 LBS | HEART RATE: 80 BPM | OXYGEN SATURATION: 95 % | SYSTOLIC BLOOD PRESSURE: 129 MMHG | TEMPERATURE: 98 F

## 2024-04-08 DIAGNOSIS — R79.0 LOW FERRITIN: ICD-10-CM

## 2024-04-08 DIAGNOSIS — G89.29 CHRONIC LOW BACK PAIN, UNSPECIFIED BACK PAIN LATERALITY, UNSPECIFIED WHETHER SCIATICA PRESENT: ICD-10-CM

## 2024-04-08 DIAGNOSIS — G47.33 OSA (OBSTRUCTIVE SLEEP APNEA): Primary | ICD-10-CM

## 2024-04-08 DIAGNOSIS — N20.0 NEPHROLITHIASIS: ICD-10-CM

## 2024-04-08 DIAGNOSIS — G47.33 OSA ON CPAP: ICD-10-CM

## 2024-04-08 DIAGNOSIS — M54.50 CHRONIC LOW BACK PAIN, UNSPECIFIED BACK PAIN LATERALITY, UNSPECIFIED WHETHER SCIATICA PRESENT: ICD-10-CM

## 2024-04-08 DIAGNOSIS — E55.9 VITAMIN D DEFICIENCY: ICD-10-CM

## 2024-04-08 DIAGNOSIS — E66.01 MORBID OBESITY WITH BMI OF 40.0-44.9, ADULT (HCC): ICD-10-CM

## 2024-04-08 DIAGNOSIS — R79.89 LOW TESTOSTERONE: ICD-10-CM

## 2024-04-08 DIAGNOSIS — Z91.09 ENVIRONMENTAL ALLERGIES: ICD-10-CM

## 2024-04-08 PROCEDURE — 99213 OFFICE O/P EST LOW 20 MIN: CPT | Performed by: PHYSICIAN ASSISTANT

## 2024-04-08 PROCEDURE — 99204 OFFICE O/P NEW MOD 45 MIN: CPT | Performed by: INTERNAL MEDICINE

## 2024-04-08 NOTE — PROGRESS NOTES
AM    HDL 28 02/08/2018 12:00 AM    LDLCALC 136 02/08/2018 12:00 AM   ,     HGA1C (T2DM ONLY)   No results found for: \"LABA1C\"     TSH   No results found for: \"TSH\"     IRON   No results found for: \"IRON\"     TIBC  No results found for: \"TIBC\"    FERRITIN  No results found for: \"FERRITIN\"    VITAMIN A  No results found for: \"RETINOL\"    NICOTINE  No results found for: \"NMET\"    UDS  No results found for: \"UDP\"    PSA  No results found for: \"LABPSA\"    GFR  Lab Results   Component Value Date/Time    LABGLOM >90 11/26/2012 01:00 PM       DEXA  No results found for this or any previous visit.         Assessment:       Diagnosis Orders   1. BMI 40.0-44.9, adult (HCC)        2. Chronic low back pain, unspecified back pain laterality, unspecified whether sciatica present        3. RUMA on CPAP        4. Vitamin D deficiency        5. Low ferritin        6. Low testosterone        7. Nephrolithiasis              Plan:    Behavior modification discussed in detail in regards to dietary habits.  Nutritional education occurred during visit. Continue following recommendations  per dietitian.  Improvement in fitness/exercise discussed with patient and the need for this  with/without surgery.   Sleep apnea clearance and 30 day download received from Dr Chapa ( [employer])   Psychology evaluation with Dr. Lopez in process next apt 6/14  EGD prior to any surgical intervention- will send referral to Dr. Anguiano per pt request  Encouraged to attend support groups. Completed x 2.   Goal to lose 3% TBW prior to surgery.  Seca scale to be completed next month ( Not working today)  Initial labs completed and reviewed    Pth 106/ Vit D 19- continue weekly vit D x 8 weeks and Calcium citrate daily.    Ferritin 11- Continue Equate MVI 2 daily.   B1 slightly elevated at 197- will monitor  Drug screen completed and reviewed  Nicotine (-). Discussed risks of nicotine a/w bariatric surgery. Must be nicotine free at least 90 days prior to surgery.

## 2024-04-08 NOTE — PATIENT INSTRUCTIONS
Behavior modification discussed in detail in regards to dietary habits.  Nutritional education occurred during visit. Continue following recommendations  per dietitian.  Improvement in fitness/exercise discussed with patient and the need for this  with/without surgery.   Sleep apnea clearance and 30 day download received from Dr Chapa ( [employer])   Psychology evaluation with Dr. Lopez in process next apt 6/14  EGD prior to any surgical intervention- will send referral to Dr. Bonilla  Encouraged to attend support groups. Completed x 2.   Goal to lose 3% TBW prior to surgery.  Seca scale to be completed next month ( Not working today)  Initial labs completed and reviewed    Pth 106/ Vit D 19- continue weekly vit D x 8 weeks and Calcium citrate daily.    Ferritin 11- Continue Equate MVI 2 daily.   B1 slightly elevated at 197- will monitor  Drug screen completed and reviewed  Nicotine (-). Discussed risks of nicotine a/w bariatric surgery. Must be nicotine free at least 90 days prior to surgery. Avoid nicotine life long post-op.   EKG completed and reviewed. NSR.    Will need to be off work for 3-4 weeks post-bariatric surgery.  No lifting/pushing/pulling over 20# for 4 weeks post-op  No NSAIDS 10 days prior to bariatric surgery. Avoid NSAID use post-op  Discussed Lovenox post-op bariatric surgery  Awaiting LOMN   Will continue following with multi-disciplinary team in preparation for bariatric surgery with the expectation of lifelong follow-up post-operatively.

## 2024-04-09 ENCOUNTER — TELEPHONE (OUTPATIENT)
Dept: PSYCHIATRY | Age: 44
End: 2024-04-09

## 2024-04-09 NOTE — TELEPHONE ENCOUNTER
Ciera from  called into the office to check the status of Ciaran's office note on 03/15/24 (new patient appt)? Please advise. Per chart patients Giovanni Followup cancelled April followup appointment (needing June timeframe); he is scheduled to return on 0614/24.

## 2024-04-09 NOTE — PROGRESS NOTES
Supervising Clinical Psychologist's Attestation Statement  I was present with the clinical psychology trainee during the history and exam. I discussed the findings and plans with the clinical psychology trainee and agree as documented in his note.    Petty Lopez, PhD  Clinical Staff Psychologist    ROUTINE PSYCHOLOGICAL EVALUATION FOR BARIATRIC SURGERY:  Ciaran Owen  is a 43 y.o. year-old, male with morbid obesity (BMI 41.21), referred for a routine psychiatric evaluation for bariatric surgery.     WEIGHT HISTORY    Ciaran Owen has considered bariatric surgery for: 8 years  Overweight since: 6th grade  Weight Loss Attempts include (self-directed/formal): Keto diet in conjunction with a  in 2017 for 12 months and lost 100 pounds but regained 100+ pounds after discontinuing the program. Weight Watchers in 2022 for 12 months and lost no weight but gained 10 pounds after discontinuation of the program.     Eating Behaviors endorsed by patient: Patient endorsed poor food choices, boredom eating, and meal skipping; however, he note improvements in poor food choices (more aware), boredom eating (stays busy), and meal skipping (tries to eat 3 times per day).     Caffeine and Carbonated beverages (last use/average use): Reports one 16oz bottle soda over 3 days and 90mg caffeine water flavoring daily, which is an improvement from 4- 12oz cups of coffee and one 16oz soda daily.     Exercise Habits: Reports 4 miles of walking/jogging and 13,000 steps daily.    Binging/Purging/Restrictive Behaviors (including SIV, laxative/stimulant abuse/obsessive exercise): Reports a history of restrictive behaviors 3 days per week since high school with the most recent episode in November 2022. Reports history of daily binging behaviors where he would eat a larger amount than others in a 2 hour period and felt a loss of control when eating beginning in elementary school to the most recent episode in June 2023. Denies

## 2024-04-09 NOTE — TELEPHONE ENCOUNTER
I just finished patient's new visit note from 3/15/2024.  I will have to addend it due to needing to touch base with my student and add in additional recommendations.     Best,     Kalpesh

## 2024-04-26 ENCOUNTER — OFFICE VISIT (OUTPATIENT)
Dept: BARIATRICS/WEIGHT MGMT | Age: 44
End: 2024-04-26

## 2024-04-26 VITALS — WEIGHT: 271.2 LBS | BODY MASS INDEX: 41.1 KG/M2 | HEIGHT: 68 IN

## 2024-05-17 NOTE — PROGRESS NOTES
Assessment: Patient is a 43 y.o. male seen for  month three  follow up MNT visit for  pre op bariatric surgery desires sleeve    Vitals from current and previous visits:       5/20/2024  1:56 PM   Vitals    SYSTOLIC    DIASTOLIC    Site    Position    Cuff Size    Pulse    Temp    Respirations    SpO2    Weight - Scale 270 lb 9.6 oz    Height 5' 8\"    Body Mass Index 41.14 kg/m2 (H)    Waist (Inches)    Neck circumference (Inches)       Initial weight at start of Weight Management Program was: 279 lbs     Ciaran lost 1 lb from last month  -Weight goal: lose weight.     -Nutritionally relevant labs:  PTH-106, B1-197, Vit A-54, Ferritin-11, Vit D less than 19  Lab Results   Component Value Date/Time    GLUCOSE 91 11/26/2012 12:52 PM    CHOL 191 02/08/2018 12:00 AM    HDL 28 (A) 02/08/2018 12:00 AM    TRIG 135 02/08/2018 12:00 AM     Has Wegovy 2.4mg from June 2023 thru current         CPAP clearance obtained  Dr Lopez follow up is 6/14/24    - Is patient taking daily Multivitamin:  Equate MVI for adults  twice a day and states has been hard to remember. - states getting back into routine to take first thing in morning for the last week    [place] 600 mg Calcium in evenings for PTH level (admits sometimes forgets) .  Weekly Vitamin D3 50,000IUs for low level- states has two weeks left  Reviewed with patient importance of vitamins after bariatric surgery to decrease risk vitamin deficiency risk    Pt, spouse and 8 year old and 19 year old  Works full time ~ 8a-5pm M-F.  Goes to bed ~ 9p-10p and wakes up 5am    -Food Recall:   Using food scale this month as wants to make sure portion sizes accurate.  Admits sometimes eats too fast then will not feel well  Breakfast: 7:30am- string cheese and banana  9a-Premier Protein shake now mid morning and helps with hunger level  Lunch: 11:30a-12p- goes home for lunch - - has been rushed with lunch meal- leftovers or Healthy Choice meal  Snack- sometimes has a snack but now

## 2024-05-20 ENCOUNTER — OFFICE VISIT (OUTPATIENT)
Dept: BARIATRICS/WEIGHT MGMT | Age: 44
End: 2024-05-20

## 2024-05-20 ENCOUNTER — OFFICE VISIT (OUTPATIENT)
Dept: BARIATRICS/WEIGHT MGMT | Age: 44
End: 2024-05-20
Payer: COMMERCIAL

## 2024-05-20 VITALS — BODY MASS INDEX: 41.01 KG/M2 | WEIGHT: 270.6 LBS | HEIGHT: 68 IN

## 2024-05-20 VITALS
BODY MASS INDEX: 40.77 KG/M2 | HEART RATE: 72 BPM | HEIGHT: 68 IN | WEIGHT: 269 LBS | TEMPERATURE: 98.3 F | DIASTOLIC BLOOD PRESSURE: 70 MMHG | SYSTOLIC BLOOD PRESSURE: 138 MMHG

## 2024-05-20 DIAGNOSIS — M54.50 CHRONIC LOW BACK PAIN, UNSPECIFIED BACK PAIN LATERALITY, UNSPECIFIED WHETHER SCIATICA PRESENT: ICD-10-CM

## 2024-05-20 DIAGNOSIS — E55.9 VITAMIN D DEFICIENCY: ICD-10-CM

## 2024-05-20 DIAGNOSIS — G47.33 OSA ON CPAP: ICD-10-CM

## 2024-05-20 DIAGNOSIS — R79.89 LOW TESTOSTERONE: ICD-10-CM

## 2024-05-20 DIAGNOSIS — G89.29 CHRONIC LOW BACK PAIN, UNSPECIFIED BACK PAIN LATERALITY, UNSPECIFIED WHETHER SCIATICA PRESENT: ICD-10-CM

## 2024-05-20 DIAGNOSIS — R79.0 LOW FERRITIN: ICD-10-CM

## 2024-05-20 DIAGNOSIS — N20.0 NEPHROLITHIASIS: ICD-10-CM

## 2024-05-20 PROCEDURE — 99213 OFFICE O/P EST LOW 20 MIN: CPT | Performed by: PHYSICIAN ASSISTANT

## 2024-05-20 NOTE — PATIENT INSTRUCTIONS
Behavior modification discussed in detail in regards to dietary habits.  Nutritional education occurred during visit. Continue following recommendations  per dietitian.  Improvement in fitness/exercise discussed with patient and the need for this  with/without surgery.   Sleep apnea clearance and 30 day download received from Dr Chapa ( [employer])   Psychology evaluation with Dr. Lopez in process next apt 6/14  EGD prior to any surgical intervention- will send referral to Dr. Mijares per patient request  Encouraged to attend support groups. Completed x 2.   Goal to lose 3% TBW prior to surgery.  Seca scale completed and reviewed.   Initial labs completed and reviewed    Pth 106/ Vit D 19- continue weekly vit D x 4 weeks and Calcium citrate daily.    Ferritin 11- Continue Equate MVI 2 daily.   B1 slightly elevated at 197- will monitor  Drug screen completed and reviewed  Nicotine (-). Discussed risks of nicotine a/w bariatric surgery. Must be nicotine free at least 90 days prior to surgery. Avoid nicotine life long post-op.   EKG completed and reviewed. NSR.    Will need to be off work for 3-4 weeks post-bariatric surgery.  No lifting/pushing/pulling over 20# for 4 weeks post-op  No NSAIDS 10 days prior to bariatric surgery. Avoid NSAID use post-op  Discussed Lovenox post-op bariatric surgery  Awaiting LOMN - PCP has form and will complete closer to surgery  Will continue following with multi-disciplinary team in preparation for bariatric surgery with the expectation of lifelong follow-up post-operatively.

## 2024-05-20 NOTE — PATIENT INSTRUCTIONS
Goals:  Nutrition Goal: Focus on eating slower specifically with dinner- take at least 20-30 minutes to eat  Water Goal: I will continue at least 64 oz or greater each day  Exercise Goal:  Strength training twice a week and cardio ~ 3-4 days a week  Stay consistent taking vitamins --- this is important for after surgery

## 2024-05-20 NOTE — PROGRESS NOTES
02/08/2018 12:00 AM   ,     HGA1C (T2DM ONLY)   No results found for: \"LABA1C\"     TSH   No results found for: \"TSH\"     IRON   No results found for: \"IRON\"     TIBC  No results found for: \"TIBC\"    FERRITIN  No results found for: \"FERRITIN\"    VITAMIN A  No results found for: \"RETINOL\"    NICOTINE  No results found for: \"NMET\"    UDS  No results found for: \"UDP\"    PSA  No results found for: \"LABPSA\"    GFR  Lab Results   Component Value Date/Time    LABGLOM >90 11/26/2012 01:00 PM       DEXA  No results found for this or any previous visit.         Assessment:       Diagnosis Orders   1. BMI 40.0-44.9, adult (HCC)        2. Chronic low back pain, unspecified back pain laterality, unspecified whether sciatica present        3. RUMA on CPAP        4. Vitamin D deficiency        5. Low ferritin        6. Low testosterone        7. Nephrolithiasis                Plan:    Behavior modification discussed in detail in regards to dietary habits.  Nutritional education occurred during visit. Continue following recommendations  per dietitian.  Improvement in fitness/exercise discussed with patient and the need for this  with/without surgery.   Sleep apnea clearance and 30 day download received from Dr Chapa ( [employer])   Psychology evaluation with Dr. Lopez in process next apt 6/14  EGD prior to any surgical intervention- will send referral to Dr. Mijares per patient request  Encouraged to attend support groups. Completed x 2.   Goal to lose 3% TBW prior to surgery. Currently down 10#.  Seca scale completed and reviewed.   Initial labs completed and reviewed    Pth 106/ Vit D 19- continue weekly vit D x 4 weeks and Calcium citrate daily.    Ferritin 11- Continue Equate MVI 2 daily.   B1 slightly elevated at 197- will monitor  Drug screen completed and reviewed  Nicotine (-). Discussed risks of nicotine a/w bariatric surgery. Must be nicotine free at least 90 days prior to surgery. Avoid nicotine life long post-op.   EKG

## 2024-06-14 ENCOUNTER — TELEMEDICINE (OUTPATIENT)
Dept: PSYCHOLOGY | Age: 44
End: 2024-06-14
Payer: COMMERCIAL

## 2024-06-14 DIAGNOSIS — F10.21 ALCOHOL USE DISORDER, SEVERE, IN SUSTAINED REMISSION (HCC): ICD-10-CM

## 2024-06-14 DIAGNOSIS — E66.01 MORBID OBESITY (HCC): ICD-10-CM

## 2024-06-14 DIAGNOSIS — F54 PSYCHOLOGICAL FACTORS AFFECTING MEDICAL CONDITION: Primary | ICD-10-CM

## 2024-06-14 PROCEDURE — 90832 PSYTX W PT 30 MINUTES: CPT | Performed by: PSYCHOLOGIST

## 2024-06-14 NOTE — PROGRESS NOTES
Supervising Clinical Psychologist's Attestation Statement  The patient met the criteria for indirect supervision.  I discussed the findings and plans with the Clinical Psychology Trainee and agree as documented in his note .    Electronically signed by Petty Lopez, PhD on 6/14/24 at 3:04 PM EDT    Ciaran Owen, was evaluated through a synchronous (real-time) audio-video encounter. The patient (or guardian if applicable) is aware that this is a billable service, which includes applicable co-pays. This Virtual Visit was conducted with patient's (and/or legal guardian's) consent. Patient identification was verified, and a caregiver was present when appropriate.   The patient was located at Home: [address] [place] [id number]  Provider was located at Home (Appt Dept State): [place]  Confirm you are appropriately licensed, registered, or certified to deliver care in the state where the patient is located as indicated above. If you are not or unsure, please re-schedule the visit: Yes, I confirm.     --Petty Lopez, PhD on 6/20/2024 at 12:40 PM    An electronic signature was used to authenticate this note.    PSYCHOLOGICAL FOLLOW-UP FOR BARIATRIC SURGERY:      History of Presenting Illness:   Mr. Ciaran Owen was initially referred for a routine psychological evaluation for bariatric surgery on 03/15/2024. At this evaluation, the following requirements were given prior to psychological clearance for WLS:  - continue to make progress towards pre-surgical weight loss goal  - continue to make healthy lifestyle changes congruent with bariatric surgery candidacy    Assessment:    Eating behaviors: Patient reports poor food choices have been improved (more aware), boredom eating has improved (stays busy), and meal skipping has improved (meal prepping).    Caffeine/Carbonation: Patient reports rare soda consumption and an energy squeezer in his water once per day.     Logging Food & Exercise: patient continues to log

## 2024-06-17 NOTE — PROGRESS NOTES
Assessment: Patient is a 43 y.o. male seen for  month four  follow up MNT visit for  pre op bariatric surgery desires sleeve    Vitals from current and previous visits:          6/19/2024  7:57 AM   Vitals    SYSTOLIC 128    DIASTOLIC 80    Site Right Upper Arm    Position Sitting    Cuff Size Large Adult    Pulse 72    Temp 98 °F (36.7 °C)    Respirations    SpO2    Weight - Scale 263 lb    Height 5' 8\"    Body Mass Index 39.99 kg/m2 (H)    Waist (Inches)    Neck circumference (Inches)     Initial weight at start of Weight Management Program was: 279 lbs     Ciaran lost 7 lbs from last month  -Weight goal: lose weight.     -Nutritionally relevant labs:  PTH-106, B1-197, Vit A-54, Ferritin-11, Vit D less than 19  Lab Results   Component Value Date/Time    GLUCOSE 91 11/26/2012 12:52 PM    CHOL 191 02/08/2018 12:00 AM    HDL 28 (A) 02/08/2018 12:00 AM    TRIG 135 02/08/2018 12:00 AM     Has Wegovy 2.4mg from June 2023 thru current .  Takes shots on Saturday        CPAP clearance obtained  Dr Lopez follow up is 6/14/24  EGD appointment today with Dr Mijares     - Is patient taking daily Multivitamin:  Equate MVI for adults  twice a day in morning  [place] 600 mg Calcium in evenings for PTH level (admits sometimes forgets) .  Completed  Vitamin D3 50,000IUs for low level- needs to get lab rechecked  Reviewed with patient importance of vitamins after bariatric surgery to decrease risk vitamin deficiency risk    Pt, spouse 8 year old and 19 year old in house  Works full time ~ 8a-5pm M-F.  Goes to bed ~ 9p-10p and wakes up 5am    -Food Recall:   States whole focus this month has been slowing down to eat and has helped to feel better after eating with less GI distress  Breakfast: 7:30am- string cheese and apple, lately having Premier powder (two scoops) protein shake in morning  Lunch: 11:30a-12p- goes home for lunch -   Snack-   Dinner: 5pm-pt and wife both cook dinner  Snack:  apples    Main Beverages: water with

## 2024-06-18 NOTE — PROGRESS NOTES
days prior to surgery. Avoid nicotine life long post-op.   EKG completed and reviewed. NSR.    Will need to be off work for 3-4 weeks post-bariatric surgery.  No lifting/pushing/pulling over 20# for 4 weeks post-op  No NSAIDS 10 days prior to bariatric surgery. Avoid NSAID use post-op  Discussed Lovenox post-op bariatric surgery  Awaiting LOMN - PCP has form and will complete closer to surgery  Will continue following with multi-disciplinary team in preparation for bariatric surgery with the expectation of lifelong follow-up post-operatively.   Will stop Wegovy 2 weeks prior to surgery.   Return in about 1 month (around 7/19/2024) for Follow up.     I spent over 31 minutes with the patient, with greater that 50% of that time spent on education, counseling and coordination of care.     Electronically signed by ARLIN Avalos on 6/19/2024 at 8:19 AM

## 2024-06-19 ENCOUNTER — OFFICE VISIT (OUTPATIENT)
Dept: BARIATRICS/WEIGHT MGMT | Age: 44
End: 2024-06-19
Payer: COMMERCIAL

## 2024-06-19 ENCOUNTER — OFFICE VISIT (OUTPATIENT)
Dept: BARIATRICS/WEIGHT MGMT | Age: 44
End: 2024-06-19

## 2024-06-19 VITALS
SYSTOLIC BLOOD PRESSURE: 128 MMHG | HEART RATE: 72 BPM | DIASTOLIC BLOOD PRESSURE: 80 MMHG | TEMPERATURE: 98 F | HEIGHT: 68 IN | BODY MASS INDEX: 39.86 KG/M2 | WEIGHT: 263 LBS

## 2024-06-19 DIAGNOSIS — N20.0 NEPHROLITHIASIS: ICD-10-CM

## 2024-06-19 DIAGNOSIS — R79.89 LOW TESTOSTERONE: ICD-10-CM

## 2024-06-19 DIAGNOSIS — G89.29 CHRONIC LOW BACK PAIN, UNSPECIFIED BACK PAIN LATERALITY, UNSPECIFIED WHETHER SCIATICA PRESENT: ICD-10-CM

## 2024-06-19 DIAGNOSIS — E55.9 VITAMIN D DEFICIENCY: ICD-10-CM

## 2024-06-19 DIAGNOSIS — G47.33 OSA ON CPAP: ICD-10-CM

## 2024-06-19 DIAGNOSIS — R79.0 LOW FERRITIN: ICD-10-CM

## 2024-06-19 DIAGNOSIS — M54.50 CHRONIC LOW BACK PAIN, UNSPECIFIED BACK PAIN LATERALITY, UNSPECIFIED WHETHER SCIATICA PRESENT: ICD-10-CM

## 2024-06-19 DIAGNOSIS — Z13.21 SCREENING FOR MALNUTRITION: ICD-10-CM

## 2024-06-19 PROCEDURE — 99214 OFFICE O/P EST MOD 30 MIN: CPT | Performed by: PHYSICIAN ASSISTANT

## 2024-06-19 NOTE — PATIENT INSTRUCTIONS
Goals:  Nutrition Goal: Continue to Focus on eating slower all meals  take at least 20-30 minutes to eat  Water Goal: I will continue at least 64 oz or greater each day  Exercise Goal:  Continue to do morning bar bell routine and walk/run as able  Stay consistent taking vitamins --- this is important for after surgery  Get lab work done given to you today

## 2024-06-19 NOTE — PATIENT INSTRUCTIONS
Behavior modification discussed in detail in regards to dietary habits.  Nutritional education occurred during visit. Continue following recommendations  per dietitian.  Improvement in fitness/exercise discussed with patient and the need for this  with/without surgery.   Sleep apnea clearance and 30 day download received from Dr Chapa ( [employer])   Psychology evaluation with Dr. Lopez completed per pt, awaiting note  EGD prior to any surgical intervention-initial apt with Dr. Mijares 6/19/24  Encouraged to attend support groups. Completed x 2.   Goal to lose 3% TBW prior to surgery. Currently down 16#.  Seca scale completed and reviewed.   Initial labs completed and reviewed    Pth 106/ Vit D 19- completed  Vit D. Order given to repeat level.    Ferritin 11- Continue Equate MVI 2 daily.  Order given to repeat level.   B1 slightly elevated at 197- will repeat level.   Drug screen completed and reviewed  Nicotine (-). Discussed risks of nicotine a/w bariatric surgery. Must be nicotine free at least 90 days prior to surgery. Avoid nicotine life long post-op.   EKG completed and reviewed. NSR.    Will need to be off work for 3-4 weeks post-bariatric surgery.  No lifting/pushing/pulling over 20# for 4 weeks post-op  No NSAIDS 10 days prior to bariatric surgery. Avoid NSAID use post-op  Discussed Lovenox post-op bariatric surgery  Awaiting LOMN - PCP has form and will complete closer to surgery  Will continue following with multi-disciplinary team in preparation for bariatric surgery with the expectation of lifelong follow-up post-operatively.

## 2024-07-15 NOTE — PROGRESS NOTES
Assessment: Patient is a 43 y.o. male seen for  month five  follow up MNT visit for  pre op bariatric surgery desires sleeve    Vitals from current and previous visits:         7/16/2024  8:51 AM   Vitals    SYSTOLIC    DIASTOLIC    Site    Position    Cuff Size    Pulse    Temp    Respirations    SpO2    Weight - Scale 265 lb    Height 5' 8\"    Body Mass Index 40.29 kg/m2 (H)    Waist (Inches)    Neck (Inches)       Initial weight at start of Weight Management Program was: 279 lbs     Ciaran gained 2 lbs this month  -Weight goal: lose weight.     -Nutritionally relevant labs:  PTH-106, B1-197, Vit A-54, Ferritin-11, Vit D less than 19  7/11/24- repeat labs Vitamin D now 26, Ferritin-15, Iron Saturation-21%, Iron-79 ,B1-178 ,PTH-58  Lab Results   Component Value Date/Time    GLUCOSE 91 11/26/2012 12:52 PM    CHOL 191 02/08/2018 12:00 AM    HDL 28 (A) 02/08/2018 12:00 AM    TRIG 135 02/08/2018 12:00 AM     Has Wegovy 2.4mg from June 2023 thru current .  Takes shots on Saturday  Hasn't taken it in two weeks ran out and in this week due to EGD        CPAP clearance obtained  Dr Lopez clearance obtained  [facility] at  [facility]    - Is patient taking daily Multivitamin:  Equate MVI for adults  twice a day in morning and encouraged pt requires to be consistent with this to get adequate iron  [place] 600 mg Calcium in evenings for PTH level and advised pt to continue this. Recommended pt start 5,000 IUs D3 daily for continued lower Vitamin D level  Reviewed with patient importance of vitamins after bariatric surgery to decrease risk vitamin deficiency risk    Pt, spouse 8 year old and 19 year old in house  Works full time ~ 8a-5pm M-F.  Goes to bed ~ 9p-10p and wakes up 5am    -Food Recall:   Had stressful month with work and personal life and states food choices were poor for about 10 days.    Breakfast: 7:30am- did eat breakfast this am - eggs   Lunch: 11:30a-12p- goes home for lunch -   Snack-   Dinner:

## 2024-07-16 ENCOUNTER — OFFICE VISIT (OUTPATIENT)
Dept: BARIATRICS/WEIGHT MGMT | Age: 44
End: 2024-07-16

## 2024-07-16 ENCOUNTER — OFFICE VISIT (OUTPATIENT)
Dept: BARIATRICS/WEIGHT MGMT | Age: 44
End: 2024-07-16
Payer: COMMERCIAL

## 2024-07-16 VITALS
SYSTOLIC BLOOD PRESSURE: 134 MMHG | HEART RATE: 73 BPM | HEIGHT: 68 IN | TEMPERATURE: 97.7 F | WEIGHT: 265 LBS | BODY MASS INDEX: 40.16 KG/M2 | DIASTOLIC BLOOD PRESSURE: 88 MMHG

## 2024-07-16 VITALS — WEIGHT: 265 LBS | BODY MASS INDEX: 40.16 KG/M2 | HEIGHT: 68 IN

## 2024-07-16 DIAGNOSIS — E55.9 VITAMIN D DEFICIENCY: ICD-10-CM

## 2024-07-16 DIAGNOSIS — Z98.84 STATUS POST BARIATRIC SURGERY: Primary | ICD-10-CM

## 2024-07-16 DIAGNOSIS — R79.89 LOW TESTOSTERONE: ICD-10-CM

## 2024-07-16 DIAGNOSIS — M54.50 CHRONIC LOW BACK PAIN, UNSPECIFIED BACK PAIN LATERALITY, UNSPECIFIED WHETHER SCIATICA PRESENT: ICD-10-CM

## 2024-07-16 DIAGNOSIS — G89.29 CHRONIC LOW BACK PAIN, UNSPECIFIED BACK PAIN LATERALITY, UNSPECIFIED WHETHER SCIATICA PRESENT: ICD-10-CM

## 2024-07-16 DIAGNOSIS — N20.0 NEPHROLITHIASIS: ICD-10-CM

## 2024-07-16 DIAGNOSIS — G47.33 OSA ON CPAP: ICD-10-CM

## 2024-07-16 PROCEDURE — 99214 OFFICE O/P EST MOD 30 MIN: CPT | Performed by: PHYSICIAN ASSISTANT

## 2024-07-16 NOTE — PATIENT INSTRUCTIONS
Goals:   Add one Vitamin D3 5,000IUs daily with your Calcium after dinner.   Continue two Multivitamins (Equate brand) in morning.   Continue to aim for regular meal times - remember order you wan to eat- choose lean protein food first, followed by vegetables and fruit, then starch food last if still hungry.  Continue running as able and dumb-bell routine in morning  Continue water 32 oz 4-6 per day

## 2024-07-16 NOTE — PATIENT INSTRUCTIONS
Behavior modification discussed in detail in regards to dietary habits.  Nutritional education occurred during visit. Continue following recommendations  per dietitian.  Improvement in fitness/exercise discussed with patient and the need for this  with/without surgery.   Sleep apnea clearance and 30 day download received from Dr Chapa ( [employer])   Psychology evaluation with Dr. Lopez completed and reviewed.   EGD scheduled  with Dr. Mijares 7/18/24  Encouraged to attend support groups. Completed x 2.   Goal to lose 3% TBW prior to surgery. Currently down 14#.  Seca scale completed and reviewed.   Initial labs completed and reviewed    Pth 58 (106)/ Vit D (26)19- start 5,000IU Vit D3 daily.    Ferritin 15 (11)- Continue Equate MVI 2 daily.   B1 now wnl at 178  Drug screen completed and reviewed  Nicotine (-). Discussed risks of nicotine a/w bariatric surgery. Must be nicotine free at least 90 days prior to surgery. Avoid nicotine life long post-op.   EKG completed and reviewed. NSR.    Will need to be off work for 3-4 weeks post-bariatric surgery.  No lifting/pushing/pulling over 20# for 4 weeks post-op  No NSAIDS 10 days prior to bariatric surgery. Avoid NSAID use post-op  Discussed Lovenox post-op bariatric surgery  Awaiting LOMN - apt with PCP 7/18/24  Will continue following with multi-disciplinary team in preparation for bariatric surgery with the expectation of lifelong follow-up post-operatively.   Will stop Wegovy 2 weeks prior to surgery.   To follow up with Dr. Oviedo next month.

## 2024-07-16 NOTE — PROGRESS NOTES
[facility]  [address], [address]  [place] [address]  [phone]      Visit Date:  7/16/2024  Weight Management Pre-Op Follow-up    HPI:    Medically Supervised follow-up- Month #5 of 6- desires pati Owen is here today for continued supervised weight management of morbid obesity.  Weight today 265#.  Up 2# since last month. Down 14# since starting with weight management. BMI 40.  Reports that he is doing well overall. Stopped Wegovy about 2 weeks ago as scheduled for EGD 7/18/24. Does not plan to resume Wegovy prior to surgery.  Reports that he had a stressful month. Admits that he could have done better with nutrition on a few days. Back on track with nutrition now. Back to being more mindful of food choices. Consistent with 3 meals.  Feeling better now that he is eating better. Drinking at least a gallon on water daily. Also drinking sugar free drinks. No pop/carbonation in the last 2 weeks. No alcohol. Has been getting in dedicated activity- lifting weights daily and running 3-4 days per week. Comorbid conditions include sleep apnea tx with CPAP, chronic low back pain, nephrolithiasis, low testosterone and allergy related asthma.  Pth 58 (106)/ Vit D (26)19- to start 5,000IU Vit D3 daily. Continue Calcium citrate daily.  Ferritin 15 (11)- taking Equate MVI 2 daily. B1 now within range at 178.  Awaiting LOMN- apt with PCP 7/18/24. Completed support groups x 2.  EGD scheduled 7/18/24 with Dr. Mijares . EKG completed and reviewed. Sleep apnea clearance and 30 day download received. 90% complaint with CPAP. Sleep apnea clearance received from Dr. Chapa.   Psychological clearance with Dr. Lopez completed and reviewed.  Seca scale completed and reviewed.  Discussed what to expect moving forward.  All questions answered.  To follow up with Dr. Oviedo next month.         Physical Activity:  Lifting weights daily for 15 minutes. Running 2-3 times per week.

## 2024-08-06 NOTE — PROGRESS NOTES
Assessment: Patient is a 43 y.o. male seen for  month six  follow up MNT visit for  pre op bariatric surgery desires sleeve    Vitals from current and previous visits:        8/9/2024  10:02 AM   Vitals    SYSTOLIC 132 !    DIASTOLIC 90 !    Site Right Upper Arm    Position Sitting    Cuff Size Large Adult    Pulse 75    Temp 97.7 °F (36.5 °C)    Respirations    SpO2    Weight - Scale 257 lb 12.8 oz    Height 5' 8\"    Body Mass Index 39.2 kg/m2 (H)    Waist (Inches)       Initial weight at start of Weight Management Program was: 279 lbs     Ciaran lost 8 lbs this month  -Weight goal: lose weight.     -Nutritionally relevant labs:  PTH-106, B1-197, Vit A-54, Ferritin-11, Vit D less than 19  7/11/24- repeat labs Vitamin D now 26, Ferritin-15, Iron Saturation-21%, Iron-79 ,B1-178 ,PTH-58  Lab Results   Component Value Date/Time    GLUCOSE 91 11/26/2012 12:52 PM    CHOL 191 02/08/2018 12:00 AM    HDL 28 (A) 02/08/2018 12:00 AM    TRIG 135 02/08/2018 12:00 AM     Has Wegovy 2.4mg from June 2023 thru current .  Takes shots on Saturday    Restarted Wegovy this past Saturday 2.4 mg  after not taking this for three weeks and admits has had nausea.    CPAP clearance obtained  Dr Lopez clearance obtained  EGD completed with distal esophagitis, multiple duodenal ulcers- prescribed Protonix 40 mg daily.  Plan to repeat EGD in three months to make sure ulcers healed.  H Pylori was negative  August 15th     - Is patient taking daily Multivitamin:  Equate MVI for adults  twice a day in morning  [place] 600 mg Calcium in evenings for PTH level and advised pt to continue this.5 ,000 IUs D3 daily for continued lower Vitamin D level  Reviewed with patient importance of vitamins after bariatric surgery to decrease risk vitamin deficiency risk    Pt, spouse 8 year old and 19 year old in house  Works full time ~ 8a-5pm M-F.  Goes to bed ~ 9p-10p and wakes up 5am    -Food Recall:  States eating better but right now with increased

## 2024-08-07 DIAGNOSIS — Z01.818 PREOP EXAMINATION: Primary | ICD-10-CM

## 2024-08-09 ENCOUNTER — OFFICE VISIT (OUTPATIENT)
Dept: BARIATRICS/WEIGHT MGMT | Age: 44
End: 2024-08-09

## 2024-08-09 VITALS
DIASTOLIC BLOOD PRESSURE: 90 MMHG | TEMPERATURE: 97.7 F | WEIGHT: 257.8 LBS | SYSTOLIC BLOOD PRESSURE: 132 MMHG | HEIGHT: 68 IN | BODY MASS INDEX: 39.07 KG/M2 | HEART RATE: 75 BPM

## 2024-08-09 DIAGNOSIS — E55.9 VITAMIN D DEFICIENCY: ICD-10-CM

## 2024-08-09 DIAGNOSIS — G47.33 OSA ON CPAP: ICD-10-CM

## 2024-08-09 DIAGNOSIS — G89.29 CHRONIC LOW BACK PAIN, UNSPECIFIED BACK PAIN LATERALITY, UNSPECIFIED WHETHER SCIATICA PRESENT: ICD-10-CM

## 2024-08-09 DIAGNOSIS — N20.0 NEPHROLITHIASIS: ICD-10-CM

## 2024-08-09 DIAGNOSIS — R79.89 LOW TESTOSTERONE: ICD-10-CM

## 2024-08-09 DIAGNOSIS — M54.50 CHRONIC LOW BACK PAIN, UNSPECIFIED BACK PAIN LATERALITY, UNSPECIFIED WHETHER SCIATICA PRESENT: ICD-10-CM

## 2024-08-09 RX ORDER — SEMAGLUTIDE 2.4 MG/.75ML
2.4 INJECTION, SOLUTION SUBCUTANEOUS
COMMUNITY

## 2024-08-09 RX ORDER — PANTOPRAZOLE SODIUM 40 MG/1
40 TABLET, DELAYED RELEASE ORAL DAILY
COMMUNITY
Start: 2024-07-18

## 2024-08-09 NOTE — PATIENT INSTRUCTIONS
Goals:  1.  Remember you will need to separate you liquids from solids after surgery.  No liquids 30 minutes before your meals and no liquids 30 minutes after your meals.   2. You will need to take your time with meals after surgery and begin practicing this now - chew foods really well and take 20-30 minutes at each meal.  3.  Aim for consistent and set meal times- choose lean protein foods first, followed by vegetables and fruit, then starch food last if still hungry.  Consistency is key following bariatric surgery.  4.  Continue regular physical activity- recommend stay as active as you can leading up to surgery and after surgery this will remain important for your weight loss efforts

## 2024-09-17 ENCOUNTER — OFFICE VISIT (OUTPATIENT)
Dept: BARIATRICS/WEIGHT MGMT | Age: 44
End: 2024-09-17

## 2024-09-17 VITALS — HEIGHT: 68 IN | WEIGHT: 246.4 LBS | BODY MASS INDEX: 37.35 KG/M2

## 2024-10-22 ENCOUNTER — PREP FOR PROCEDURE (OUTPATIENT)
Dept: BARIATRICS/WEIGHT MGMT | Age: 44
End: 2024-10-22

## 2024-10-22 RX ORDER — SODIUM CHLORIDE 9 MG/ML
INJECTION, SOLUTION INTRAVENOUS PRN
Status: CANCELLED | OUTPATIENT
Start: 2024-11-11

## 2024-10-22 RX ORDER — SCOLOPAMINE TRANSDERMAL SYSTEM 1 MG/1
1 PATCH, EXTENDED RELEASE TRANSDERMAL
Status: CANCELLED | OUTPATIENT
Start: 2024-11-11 | End: 2024-11-14

## 2024-10-22 RX ORDER — DEXAMETHASONE SODIUM PHOSPHATE 10 MG/ML
10 INJECTION, SOLUTION INTRAMUSCULAR; INTRAVENOUS ONCE
Status: CANCELLED | OUTPATIENT
Start: 2024-11-11 | End: 2024-11-11

## 2024-10-22 RX ORDER — FAMOTIDINE 10 MG/ML
20 INJECTION, SOLUTION INTRAVENOUS ONCE
Status: CANCELLED | OUTPATIENT
Start: 2024-11-11 | End: 2024-11-11

## 2024-10-22 RX ORDER — SODIUM CHLORIDE 0.9 % (FLUSH) 0.9 %
5-40 SYRINGE (ML) INJECTION EVERY 12 HOURS SCHEDULED
Status: CANCELLED | OUTPATIENT
Start: 2024-11-11

## 2024-10-22 RX ORDER — SODIUM CHLORIDE 9 MG/ML
INJECTION, SOLUTION INTRAVENOUS CONTINUOUS
Status: CANCELLED | OUTPATIENT
Start: 2024-11-11

## 2024-10-22 RX ORDER — ONDANSETRON 2 MG/ML
4 INJECTION INTRAMUSCULAR; INTRAVENOUS ONCE
Status: CANCELLED | OUTPATIENT
Start: 2024-11-11 | End: 2024-11-11

## 2024-10-22 RX ORDER — SODIUM CHLORIDE 0.9 % (FLUSH) 0.9 %
5-40 SYRINGE (ML) INJECTION PRN
Status: CANCELLED | OUTPATIENT
Start: 2024-11-11

## 2024-10-23 RX ORDER — DEXAMETHASONE SODIUM PHOSPHATE 10 MG/ML
10 INJECTION, SOLUTION INTRAMUSCULAR; INTRAVENOUS ONCE
Status: CANCELLED | OUTPATIENT
Start: 2024-11-11 | End: 2024-11-11

## 2024-10-23 RX ORDER — SODIUM CHLORIDE 9 MG/ML
INJECTION, SOLUTION INTRAVENOUS PRN
Status: CANCELLED | OUTPATIENT
Start: 2024-11-11

## 2024-10-23 RX ORDER — SODIUM CHLORIDE 0.9 % (FLUSH) 0.9 %
5-40 SYRINGE (ML) INJECTION PRN
Status: CANCELLED | OUTPATIENT
Start: 2024-11-11

## 2024-10-23 RX ORDER — ENOXAPARIN SODIUM 100 MG/ML
40 INJECTION SUBCUTANEOUS ONCE
Status: CANCELLED | OUTPATIENT
Start: 2024-11-11 | End: 2024-11-11

## 2024-10-23 RX ORDER — SODIUM CHLORIDE 9 MG/ML
INJECTION, SOLUTION INTRAVENOUS CONTINUOUS
Status: CANCELLED | OUTPATIENT
Start: 2024-11-11

## 2024-10-23 RX ORDER — SODIUM CHLORIDE 0.9 % (FLUSH) 0.9 %
5-40 SYRINGE (ML) INJECTION EVERY 12 HOURS SCHEDULED
Status: CANCELLED | OUTPATIENT
Start: 2024-11-11

## 2024-10-28 ENCOUNTER — OFFICE VISIT (OUTPATIENT)
Dept: BARIATRICS/WEIGHT MGMT | Age: 44
End: 2024-10-28

## 2024-10-28 VITALS — BODY MASS INDEX: 37.04 KG/M2 | WEIGHT: 244.4 LBS | HEIGHT: 68 IN

## 2024-10-28 RX ORDER — POLYETHYLENE GLYCOL 3350 17 G/17G
17 POWDER, FOR SOLUTION ORAL DAILY
Qty: 510 G | Refills: 1 | Status: SHIPPED | OUTPATIENT
Start: 2024-11-11

## 2024-10-28 RX ORDER — ENOXAPARIN SODIUM 100 MG/ML
40 INJECTION SUBCUTANEOUS DAILY
Qty: 14 EACH | Refills: 0 | Status: SHIPPED | OUTPATIENT
Start: 2024-11-11

## 2024-10-28 RX ORDER — ONDANSETRON 4 MG/1
4 TABLET, FILM COATED ORAL EVERY 4 HOURS PRN
Qty: 30 TABLET | Refills: 0 | Status: SHIPPED | OUTPATIENT
Start: 2024-11-11 | End: 2024-11-25

## 2024-10-28 RX ORDER — METOCLOPRAMIDE 10 MG/1
10 TABLET ORAL EVERY 6 HOURS PRN
Qty: 30 TABLET | Refills: 0 | Status: SHIPPED | OUTPATIENT
Start: 2024-11-11 | End: 2024-11-25

## 2024-10-28 RX ORDER — ASPIRIN 81 MG
100 TABLET, DELAYED RELEASE (ENTERIC COATED) ORAL 2 TIMES DAILY
Qty: 30 TABLET | Refills: 1 | Status: SHIPPED | OUTPATIENT
Start: 2024-11-11

## 2024-10-28 NOTE — PROGRESS NOTES
Ciaran lost 35 lbs  since joining Weight Management Program.  After nutrition evaluation and education, patient is considered to be a good surgical candidate from a MNT perspective.   Patient's body composition for pre-op teaching class  was measured using the Seca Scale. Results were saved and reviewed with the patient.   Patient was educated on 2- week pre-operative nutrition protocol and post test completed. Pre-operative and post-operative diet guidelines were discussed and written information was provided. Nectar protein supplements were purchased. Vitamin regimen with Bariatric Advantage and/or Celebrate vitamins was explained, and patient purchased a 90 day supply at this visit. Importance of vitamin compliance was discussed and potential of vitamin deficiencies was reviewed.   Encouraged continued physical activity.  Patient signed agreement stating they are committed to lifelong follow up, smoking and alcohol cessation, vitamin compliance, and 2 week pre-operative dietary protocol.

## 2024-10-28 NOTE — PROGRESS NOTES
Ciaran Owen was seen today for pre-operative teaching class. He   was educated today on surgery procedure, possible complications, Lovenox self administration (teaching guide given to patient as well as Lovenox DVD viewed today), use of incentive spirometer for 2 weeks prior to surgery date and instructed to remain NPO after midnight the night before surgery, or risk cancellation of surgical procedure.  Importance of recognizing signs and symptoms of wound infection were discussed as well as when to contact the physician.  We discussed the flow of events on the day of surgery from admit to SDS, OR, PACU, and 7K admit.  I reinforced the need to walk post operative on 7K and he voiced understanding of this.  Pre-operative measurements were taken and scanned into chart as well as measurement for PO day 1 gastrograffin swallow test. SECA scale completed today. Educated on post-op pain medication and how to dispose of any unused pain medications.